# Patient Record
Sex: FEMALE | Race: WHITE | NOT HISPANIC OR LATINO
[De-identification: names, ages, dates, MRNs, and addresses within clinical notes are randomized per-mention and may not be internally consistent; named-entity substitution may affect disease eponyms.]

---

## 2017-01-31 ENCOUNTER — APPOINTMENT (OUTPATIENT)
Dept: UROGYNECOLOGY | Facility: CLINIC | Age: 77
End: 2017-01-31

## 2019-05-14 ENCOUNTER — TRANSCRIPTION ENCOUNTER (OUTPATIENT)
Age: 79
End: 2019-05-14

## 2019-09-03 ENCOUNTER — RESULT REVIEW (OUTPATIENT)
Age: 79
End: 2019-09-03

## 2019-09-16 ENCOUNTER — APPOINTMENT (OUTPATIENT)
Dept: INTERNAL MEDICINE | Facility: CLINIC | Age: 79
End: 2019-09-16
Payer: MEDICARE

## 2019-09-16 ENCOUNTER — NON-APPOINTMENT (OUTPATIENT)
Age: 79
End: 2019-09-16

## 2019-09-16 VITALS
HEART RATE: 72 BPM | TEMPERATURE: 98.3 F | OXYGEN SATURATION: 98 % | HEIGHT: 64 IN | BODY MASS INDEX: 31.07 KG/M2 | WEIGHT: 182 LBS

## 2019-09-16 VITALS — SYSTOLIC BLOOD PRESSURE: 125 MMHG | DIASTOLIC BLOOD PRESSURE: 65 MMHG

## 2019-09-16 PROCEDURE — 36415 COLL VENOUS BLD VENIPUNCTURE: CPT

## 2019-09-16 PROCEDURE — 93000 ELECTROCARDIOGRAM COMPLETE: CPT

## 2019-09-16 PROCEDURE — 99214 OFFICE O/P EST MOD 30 MIN: CPT | Mod: 25

## 2019-09-16 RX ORDER — ASPIRIN 81 MG/1
81 TABLET ORAL DAILY
Refills: 0 | Status: ACTIVE | COMMUNITY
Start: 2019-09-16

## 2019-09-16 RX ORDER — ZOLPIDEM TARTRATE 5 MG/1
5 TABLET, FILM COATED ORAL
Qty: 30 | Refills: 5 | Status: ACTIVE | COMMUNITY
Start: 2019-09-16

## 2019-09-16 RX ORDER — MODAFINIL 100 MG/1
100 TABLET ORAL DAILY
Refills: 0 | Status: ACTIVE | COMMUNITY
Start: 2019-09-16

## 2019-09-16 RX ORDER — LANCETS 30 GAUGE
EACH MISCELLANEOUS
Qty: 100 | Refills: 2 | Status: ACTIVE | COMMUNITY
Start: 2019-09-16 | End: 1900-01-01

## 2019-09-16 NOTE — PHYSICAL EXAM
[No Acute Distress] : no acute distress [Well Developed] : well developed [Well Nourished] : well nourished [Well-Appearing] : well-appearing [Normal Sclera/Conjunctiva] : normal sclera/conjunctiva [PERRL] : pupils equal round and reactive to light [EOMI] : extraocular movements intact [Normal Outer Ear/Nose] : the outer ears and nose were normal in appearance [Normal Oropharynx] : the oropharynx was normal [No JVD] : no jugular venous distention [No Lymphadenopathy] : no lymphadenopathy [Supple] : supple [Thyroid Normal, No Nodules] : the thyroid was normal and there were no nodules present [No Respiratory Distress] : no respiratory distress  [Clear to Auscultation] : lungs were clear to auscultation bilaterally [No Accessory Muscle Use] : no accessory muscle use [Normal Rate] : normal rate  [Regular Rhythm] : with a regular rhythm [No Murmur] : no murmur heard [Normal S1, S2] : normal S1 and S2 [No Carotid Bruits] : no carotid bruits [No Abdominal Bruit] : a ~M bruit was not heard ~T in the abdomen [No Varicosities] : no varicosities [Pedal Pulses Present] : the pedal pulses are present [No Edema] : there was no peripheral edema [No Palpable Aorta] : no palpable aorta [No Extremity Clubbing/Cyanosis] : no extremity clubbing/cyanosis [Soft] : abdomen soft [Non Tender] : non-tender [Non-distended] : non-distended [No Masses] : no abdominal mass palpated [Normal Bowel Sounds] : normal bowel sounds [No HSM] : no HSM [Normal Posterior Cervical Nodes] : no posterior cervical lymphadenopathy [No CVA Tenderness] : no CVA  tenderness [Normal Anterior Cervical Nodes] : no anterior cervical lymphadenopathy [No Spinal Tenderness] : no spinal tenderness [No Joint Swelling] : no joint swelling [Grossly Normal Strength/Tone] : grossly normal strength/tone [No Rash] : no rash [No Focal Deficits] : no focal deficits [Coordination Grossly Intact] : coordination grossly intact [Normal Gait] : normal gait [Normal Affect] : the affect was normal [Deep Tendon Reflexes (DTR)] : deep tendon reflexes were 2+ and symmetric [Normal Insight/Judgement] : insight and judgment were intact

## 2019-09-16 NOTE — HISTORY OF PRESENT ILLNESS
[de-identified] : the patient is a 71-year-old female comes in for followup of diabetes hyperlipidemia hypothyroid and GERD. She is with a nutritionist and has lost 3 pounds to 180, she does notepolydipsia polyuria polyphasia and her sugars at home are 100-150. She has had her eye checked and there is no problems. she denies chest pain palpitations swelling thinking but dyspnea and is working with a  She has no claudicationand no weakness numbness loss of balance and coordination At the present time she has no significant complaintsand is compliant with all her medications WDL

## 2019-09-16 NOTE — ASSESSMENT
[FreeTextEntry1] : the patient's type 2 diabetes and is on Actos beginning an EKG urine and blood and hemoglobin A1c. She appears to be well controlled by her home sugars and diet Blood pressure is well controlled She is status post left knee replacement and is having some pain bilaterally in the hips She is overall doing well. EKG shows a 2 R-wave in V2 as well as left anterior hemiblock but no other significant changes

## 2019-09-18 LAB
ALBUMIN SERPL ELPH-MCNC: 4.6 G/DL
ALP BLD-CCNC: 60 U/L
ALT SERPL-CCNC: 19 U/L
ANION GAP SERPL CALC-SCNC: 12 MMOL/L
APPEARANCE: CLEAR
AST SERPL-CCNC: 20 U/L
BACTERIA: NEGATIVE
BASOPHILS # BLD AUTO: 0.06 K/UL
BASOPHILS NFR BLD AUTO: 0.6 %
BILIRUB SERPL-MCNC: 0.3 MG/DL
BILIRUBIN URINE: NEGATIVE
BLOOD URINE: NEGATIVE
BUN SERPL-MCNC: 19 MG/DL
CALCIUM SERPL-MCNC: 10.3 MG/DL
CHLORIDE SERPL-SCNC: 103 MMOL/L
CHOLEST SERPL-MCNC: 154 MG/DL
CHOLEST/HDLC SERPL: 2.5 RATIO
CK SERPL-CCNC: 129 U/L
CO2 SERPL-SCNC: 25 MMOL/L
COLOR: NORMAL
CREAT SERPL-MCNC: 1 MG/DL
EOSINOPHIL # BLD AUTO: 0.31 K/UL
EOSINOPHIL NFR BLD AUTO: 3.3 %
ESTIMATED AVERAGE GLUCOSE: 154 MG/DL
GLUCOSE QUALITATIVE U: NEGATIVE
GLUCOSE SERPL-MCNC: 107 MG/DL
HBA1C MFR BLD HPLC: 7 %
HCT VFR BLD CALC: 41.1 %
HDLC SERPL-MCNC: 62 MG/DL
HGB BLD-MCNC: 12.7 G/DL
HYALINE CASTS: 0 /LPF
IMM GRANULOCYTES NFR BLD AUTO: 0.3 %
KETONES URINE: NEGATIVE
LDLC SERPL CALC-MCNC: 69 MG/DL
LEUKOCYTE ESTERASE URINE: ABNORMAL
LYMPHOCYTES # BLD AUTO: 3.12 K/UL
LYMPHOCYTES NFR BLD AUTO: 33.2 %
MAN DIFF?: NORMAL
MCHC RBC-ENTMCNC: 30.9 GM/DL
MCHC RBC-ENTMCNC: 31.1 PG
MCV RBC AUTO: 100.7 FL
MICROSCOPIC-UA: NORMAL
MONOCYTES # BLD AUTO: 0.8 K/UL
MONOCYTES NFR BLD AUTO: 8.5 %
NEUTROPHILS # BLD AUTO: 5.08 K/UL
NEUTROPHILS NFR BLD AUTO: 54.1 %
NITRITE URINE: NEGATIVE
PH URINE: 6
PLATELET # BLD AUTO: 303 K/UL
POTASSIUM SERPL-SCNC: 5 MMOL/L
PROT SERPL-MCNC: 6.8 G/DL
PROTEIN URINE: NEGATIVE
RBC # BLD: 4.08 M/UL
RBC # FLD: 13.7 %
RED BLOOD CELLS URINE: 0 /HPF
SODIUM SERPL-SCNC: 140 MMOL/L
SPECIFIC GRAVITY URINE: 1.01
SQUAMOUS EPITHELIAL CELLS: 2 /HPF
T4 SERPL-MCNC: 6.7 UG/DL
TRIGL SERPL-MCNC: 115 MG/DL
TSH SERPL-ACNC: 1.86 UIU/ML
UROBILINOGEN URINE: NORMAL
WBC # FLD AUTO: 9.4 K/UL
WHITE BLOOD CELLS URINE: 2 /HPF

## 2019-10-02 ENCOUNTER — RX RENEWAL (OUTPATIENT)
Age: 79
End: 2019-10-02

## 2019-10-23 ENCOUNTER — RX RENEWAL (OUTPATIENT)
Age: 79
End: 2019-10-23

## 2019-10-23 RX ORDER — LANCETS 30 GAUGE
EACH MISCELLANEOUS
Qty: 100 | Refills: 3 | Status: ACTIVE | COMMUNITY
Start: 2019-09-25 | End: 1900-01-01

## 2019-11-26 ENCOUNTER — RECORD ABSTRACTING (OUTPATIENT)
Age: 79
End: 2019-11-26

## 2019-11-26 DIAGNOSIS — Z80.0 FAMILY HISTORY OF MALIGNANT NEOPLASM OF DIGESTIVE ORGANS: ICD-10-CM

## 2019-11-26 DIAGNOSIS — Z82.49 FAMILY HISTORY OF ISCHEMIC HEART DISEASE AND OTHER DISEASES OF THE CIRCULATORY SYSTEM: ICD-10-CM

## 2019-11-26 DIAGNOSIS — Z86.010 PERSONAL HISTORY OF COLONIC POLYPS: ICD-10-CM

## 2019-11-27 ENCOUNTER — APPOINTMENT (OUTPATIENT)
Dept: INTERNAL MEDICINE | Facility: CLINIC | Age: 79
End: 2019-11-27
Payer: MEDICARE

## 2019-11-27 ENCOUNTER — NON-APPOINTMENT (OUTPATIENT)
Age: 79
End: 2019-11-27

## 2019-11-27 VITALS
OXYGEN SATURATION: 98 % | WEIGHT: 180 LBS | DIASTOLIC BLOOD PRESSURE: 80 MMHG | HEART RATE: 76 BPM | SYSTOLIC BLOOD PRESSURE: 120 MMHG | BODY MASS INDEX: 30.73 KG/M2 | HEIGHT: 64 IN

## 2019-11-27 PROCEDURE — 93000 ELECTROCARDIOGRAM COMPLETE: CPT

## 2019-11-27 PROCEDURE — 82270 OCCULT BLOOD FECES: CPT

## 2019-11-27 PROCEDURE — G0439: CPT

## 2019-11-27 PROCEDURE — 99213 OFFICE O/P EST LOW 20 MIN: CPT | Mod: 25

## 2019-11-27 PROCEDURE — 36415 COLL VENOUS BLD VENIPUNCTURE: CPT

## 2019-11-27 RX ORDER — OMEPRAZOLE 40 MG/1
40 CAPSULE, DELAYED RELEASE ORAL
Qty: 30 | Refills: 11 | Status: DISCONTINUED | COMMUNITY
Start: 2019-09-16 | End: 2019-11-27

## 2019-11-27 RX ORDER — BLOOD SUGAR DIAGNOSTIC
STRIP MISCELLANEOUS 4 TIMES DAILY
Qty: 100 | Refills: 3 | Status: ACTIVE | COMMUNITY
Start: 2019-09-25 | End: 1900-01-01

## 2019-11-27 NOTE — HISTORY OF PRESENT ILLNESS
[de-identified] : the patient is a 71-year-old female who has type 2 diabetes mellitus hyperlipidemia, hypothyroidism GERDas well as depression and comes in now for a complete physical examination.Her sugars at home have been labile and she is only on Actos 15 mg daily. She has lost 5 pounds. Her last hemoglobin A1c was 7.0after being 6.3 She had previously been on metformin but did not tolerate it GI-wise. She has her eyes checked and they are negative She has no symptoms of peripheral neuropathy and denies chest pain palpitations swelling or faintingand is physically active.At the present time she is not having any nausea vomiting pain in the belly positive bowel black bowel and is due for colonoscopy She has no urinary changes and denies polydipsia polyuria polyphagia. She has no gynecologic symptoms and is up to date on mammograms. She is up to date on all vaccinations

## 2019-11-27 NOTE — HEALTH RISK ASSESSMENT
[0] : 2) Feeling down, depressed, or hopeless: Not at all (0) [With Patient/Caregiver] : With Patient/Caregiver [Relationship: ___] : Relationship: [unfilled] [I will adhere to the patient's wishes as expressed in the advance directive except as noted below.] : I will adhere to the patient's wishes as expressed in the advance directive except as noted below [AdvancecareDate] : 11/27/19 [FreeTextEntry4] : The patient is now ready to decide on aggressive workup or treatment at this time

## 2019-11-27 NOTE — PHYSICAL EXAM
[Normal] : normal gait, coordination grossly intact, no focal deficits [de-identified] : overweight [de-identified] : no significant lesion [FreeTextEntry1] : guaiac negative no lesions [de-identified] : No [de-identified] : No significant lesion [de-identified] : Normal affect and doing

## 2019-11-27 NOTE — ASSESSMENT
[FreeTextEntry1] : the patient's physical exam appears to be normal in all screening tests are done. Her cardiogram shows no significant findings other thanLVH by voltage criteria Her thyroid cholesterol and diabetic status are all checked She is to get a colonoscopy is up to date on all other testing including mammograms gynecologic exam and vaccinations. We're awaiting her hemoglobin A1c

## 2019-11-28 LAB
25(OH)D3 SERPL-MCNC: 32.1 NG/ML
ALBUMIN SERPL ELPH-MCNC: 4.4 G/DL
ALP BLD-CCNC: 64 U/L
ALT SERPL-CCNC: 18 U/L
ANION GAP SERPL CALC-SCNC: 12 MMOL/L
APPEARANCE: CLEAR
AST SERPL-CCNC: 15 U/L
BACTERIA: NEGATIVE
BASOPHILS # BLD AUTO: 0.05 K/UL
BASOPHILS NFR BLD AUTO: 0.6 %
BILIRUB SERPL-MCNC: 0.3 MG/DL
BILIRUBIN URINE: NEGATIVE
BLOOD URINE: NEGATIVE
BUN SERPL-MCNC: 24 MG/DL
CALCIUM SERPL-MCNC: 10 MG/DL
CHLORIDE SERPL-SCNC: 103 MMOL/L
CHOLEST SERPL-MCNC: 183 MG/DL
CHOLEST/HDLC SERPL: 2.7 RATIO
CO2 SERPL-SCNC: 27 MMOL/L
COLOR: YELLOW
CREAT SERPL-MCNC: 0.92 MG/DL
EOSINOPHIL # BLD AUTO: 0.28 K/UL
EOSINOPHIL NFR BLD AUTO: 3.1 %
ESTIMATED AVERAGE GLUCOSE: 134 MG/DL
GLUCOSE QUALITATIVE U: NEGATIVE
GLUCOSE SERPL-MCNC: 144 MG/DL
HBA1C MFR BLD HPLC: 6.3 %
HCT VFR BLD CALC: 40.8 %
HDLC SERPL-MCNC: 67 MG/DL
HGB BLD-MCNC: 12.9 G/DL
HYALINE CASTS: 0 /LPF
IMM GRANULOCYTES NFR BLD AUTO: 0.6 %
KETONES URINE: NEGATIVE
LDLC SERPL CALC-MCNC: 86 MG/DL
LEUKOCYTE ESTERASE URINE: ABNORMAL
LYMPHOCYTES # BLD AUTO: 2.13 K/UL
LYMPHOCYTES NFR BLD AUTO: 24 %
MAN DIFF?: NORMAL
MCHC RBC-ENTMCNC: 30.8 PG
MCHC RBC-ENTMCNC: 31.6 GM/DL
MCV RBC AUTO: 97.4 FL
MICROSCOPIC-UA: NORMAL
MONOCYTES # BLD AUTO: 0.76 K/UL
MONOCYTES NFR BLD AUTO: 8.5 %
NEUTROPHILS # BLD AUTO: 5.62 K/UL
NEUTROPHILS NFR BLD AUTO: 63.2 %
NITRITE URINE: NEGATIVE
PH URINE: 7
PLATELET # BLD AUTO: 356 K/UL
POTASSIUM SERPL-SCNC: 5.1 MMOL/L
PROT SERPL-MCNC: 6.8 G/DL
PROTEIN URINE: NEGATIVE
RBC # BLD: 4.19 M/UL
RBC # FLD: 14.5 %
RED BLOOD CELLS URINE: 2 /HPF
SODIUM SERPL-SCNC: 142 MMOL/L
SPECIFIC GRAVITY URINE: 1.02
SQUAMOUS EPITHELIAL CELLS: 4 /HPF
T4 SERPL-MCNC: 6.1 UG/DL
TRIGL SERPL-MCNC: 151 MG/DL
TSH SERPL-ACNC: 2.41 UIU/ML
UROBILINOGEN URINE: NORMAL
WBC # FLD AUTO: 8.89 K/UL
WHITE BLOOD CELLS URINE: 5 /HPF

## 2019-12-22 ENCOUNTER — RX RENEWAL (OUTPATIENT)
Age: 79
End: 2019-12-22

## 2019-12-26 ENCOUNTER — RX RENEWAL (OUTPATIENT)
Age: 79
End: 2019-12-26

## 2020-01-20 ENCOUNTER — RX RENEWAL (OUTPATIENT)
Age: 80
End: 2020-01-20

## 2020-03-16 ENCOUNTER — TRANSCRIPTION ENCOUNTER (OUTPATIENT)
Age: 80
End: 2020-03-16

## 2020-03-16 ENCOUNTER — RX RENEWAL (OUTPATIENT)
Age: 80
End: 2020-03-16

## 2020-03-30 ENCOUNTER — RX RENEWAL (OUTPATIENT)
Age: 80
End: 2020-03-30

## 2020-06-25 ENCOUNTER — RX RENEWAL (OUTPATIENT)
Age: 80
End: 2020-06-25

## 2020-08-20 ENCOUNTER — APPOINTMENT (OUTPATIENT)
Dept: INTERNAL MEDICINE | Facility: CLINIC | Age: 80
End: 2020-08-20
Payer: MEDICARE

## 2020-08-20 VITALS
OXYGEN SATURATION: 96 % | DIASTOLIC BLOOD PRESSURE: 80 MMHG | HEIGHT: 64 IN | WEIGHT: 181 LBS | SYSTOLIC BLOOD PRESSURE: 130 MMHG | TEMPERATURE: 98 F | HEART RATE: 75 BPM | BODY MASS INDEX: 30.9 KG/M2

## 2020-08-20 PROCEDURE — 99214 OFFICE O/P EST MOD 30 MIN: CPT | Mod: 25

## 2020-08-20 PROCEDURE — 36415 COLL VENOUS BLD VENIPUNCTURE: CPT

## 2020-08-20 RX ORDER — CITALOPRAM 10 MG/1
10 TABLET, FILM COATED ORAL DAILY
Refills: 0 | Status: DISCONTINUED | COMMUNITY
Start: 2019-09-16 | End: 2020-08-20

## 2020-08-20 NOTE — HISTORY OF PRESENT ILLNESS
[de-identified] : Patient is a 80-year-old female comes in for followup of diabetes on Actos, hypothyroidism, and hyperlipidemia. The patient has found it hard to follow a diabetic diet and has gained 4 pounds. She is not checking home sugars and does have increasing urination. She has no chest pain, palpitations, swelling, thinking or dyspnea. She has had her eyes checked within the last year and has no changes in vision. She has no findings of suggestion of peripheral neuropathy or weakness. She denies muscle aches and pains GI symptoms or symptoms of hypothyroidism. She continues to see psychiatry for anxiety, depression, and is on medications as noted

## 2020-08-20 NOTE — REVIEW OF SYSTEMS
[Recent Change In Weight] : ~T recent weight change [Negative] : Heme/Lymph [FreeTextEntry9] : Patient complains of pain in the left hip, which is in constant

## 2020-08-20 NOTE — ASSESSMENT
[FreeTextEntry1] : We are checking her diabetic status, and hemoglobin A1c. She has no evidence of endorgan disease at this time. We're rechecking her thyroid status with thyroid function tests, and we're checking CPK, liver functions, and lipids. She is advised to get on a better diet and she will see endocrinology as well

## 2020-08-20 NOTE — PHYSICAL EXAM
[No Acute Distress] : no acute distress [Well Nourished] : well nourished [Well Developed] : well developed [Well-Appearing] : well-appearing [PERRL] : pupils equal round and reactive to light [Normal Sclera/Conjunctiva] : normal sclera/conjunctiva [EOMI] : extraocular movements intact [Normal Outer Ear/Nose] : the outer ears and nose were normal in appearance [Normal Oropharynx] : the oropharynx was normal [No JVD] : no jugular venous distention [No Lymphadenopathy] : no lymphadenopathy [Supple] : supple [Thyroid Normal, No Nodules] : the thyroid was normal and there were no nodules present [No Respiratory Distress] : no respiratory distress  [No Accessory Muscle Use] : no accessory muscle use [Clear to Auscultation] : lungs were clear to auscultation bilaterally [Normal Rate] : normal rate  [Regular Rhythm] : with a regular rhythm [Normal S1, S2] : normal S1 and S2 [No Murmur] : no murmur heard [No Carotid Bruits] : no carotid bruits [No Abdominal Bruit] : a ~M bruit was not heard ~T in the abdomen [No Varicosities] : no varicosities [Pedal Pulses Present] : the pedal pulses are present [No Edema] : there was no peripheral edema [No Palpable Aorta] : no palpable aorta [No Extremity Clubbing/Cyanosis] : no extremity clubbing/cyanosis [Soft] : abdomen soft [Non Tender] : non-tender [Non-distended] : non-distended [No Masses] : no abdominal mass palpated [No HSM] : no HSM [Normal Bowel Sounds] : normal bowel sounds [Normal Posterior Cervical Nodes] : no posterior cervical lymphadenopathy [Normal Anterior Cervical Nodes] : no anterior cervical lymphadenopathy [No CVA Tenderness] : no CVA  tenderness [No Spinal Tenderness] : no spinal tenderness [Grossly Normal Strength/Tone] : grossly normal strength/tone [No Joint Swelling] : no joint swelling [No Rash] : no rash [Coordination Grossly Intact] : coordination grossly intact [No Focal Deficits] : no focal deficits [Normal Gait] : normal gait [Normal Affect] : the affect was normal [Normal] : affect was normal and insight and judgment were intact [Comprehensive Foot Exam Normal] : Right and left foot were examined and both feet are normal. No ulcers in either foot. Toes are normal and with full ROM.  Normal tactile sensation with monofilament testing throughout both feet [Normal Insight/Judgement] : insight and judgment were intact [de-identified] : Overweight

## 2020-08-21 LAB
ALBUMIN SERPL ELPH-MCNC: 4.8 G/DL
ALP BLD-CCNC: 65 U/L
ALT SERPL-CCNC: 26 U/L
ANION GAP SERPL CALC-SCNC: 13 MMOL/L
APPEARANCE: CLEAR
AST SERPL-CCNC: 21 U/L
BACTERIA: NEGATIVE
BASOPHILS # BLD AUTO: 0.07 K/UL
BASOPHILS NFR BLD AUTO: 0.7 %
BILIRUB SERPL-MCNC: 0.4 MG/DL
BILIRUBIN URINE: NEGATIVE
BLOOD URINE: NEGATIVE
BUN SERPL-MCNC: 20 MG/DL
CALCIUM SERPL-MCNC: 10.3 MG/DL
CHLORIDE SERPL-SCNC: 102 MMOL/L
CHOLEST SERPL-MCNC: 199 MG/DL
CHOLEST/HDLC SERPL: 3 RATIO
CK SERPL-CCNC: 70 U/L
CO2 SERPL-SCNC: 25 MMOL/L
COLOR: YELLOW
CREAT SERPL-MCNC: 1.02 MG/DL
CRP SERPL HS-MCNC: 2.28 MG/L
EOSINOPHIL # BLD AUTO: 0.31 K/UL
EOSINOPHIL NFR BLD AUTO: 3.2 %
ESTIMATED AVERAGE GLUCOSE: 143 MG/DL
GLUCOSE QUALITATIVE U: NEGATIVE
GLUCOSE SERPL-MCNC: 124 MG/DL
HBA1C MFR BLD HPLC: 6.6 %
HCT VFR BLD CALC: 46.2 %
HDLC SERPL-MCNC: 67 MG/DL
HGB BLD-MCNC: 13.9 G/DL
HYALINE CASTS: 2 /LPF
IMM GRANULOCYTES NFR BLD AUTO: 0.6 %
KETONES URINE: NEGATIVE
LDLC SERPL CALC-MCNC: 100 MG/DL
LEUKOCYTE ESTERASE URINE: ABNORMAL
LYMPHOCYTES # BLD AUTO: 2.63 K/UL
LYMPHOCYTES NFR BLD AUTO: 26.9 %
MAN DIFF?: NORMAL
MCHC RBC-ENTMCNC: 30.1 GM/DL
MCHC RBC-ENTMCNC: 31 PG
MCV RBC AUTO: 103.1 FL
MICROSCOPIC-UA: NORMAL
MONOCYTES # BLD AUTO: 0.76 K/UL
MONOCYTES NFR BLD AUTO: 7.8 %
NEUTROPHILS # BLD AUTO: 5.94 K/UL
NEUTROPHILS NFR BLD AUTO: 60.8 %
NITRITE URINE: NEGATIVE
PH URINE: 6.5
PLATELET # BLD AUTO: 402 K/UL
POTASSIUM SERPL-SCNC: 5.1 MMOL/L
PROT SERPL-MCNC: 7.1 G/DL
PROTEIN URINE: NEGATIVE
RBC # BLD: 4.48 M/UL
RBC # FLD: 14.4 %
RED BLOOD CELLS URINE: 1 /HPF
SARS-COV-2 IGG SERPL IA-ACNC: 0.08 INDEX
SARS-COV-2 IGG SERPL QL IA: NEGATIVE
SODIUM SERPL-SCNC: 140 MMOL/L
SPECIFIC GRAVITY URINE: 1.02
SQUAMOUS EPITHELIAL CELLS: 5 /HPF
T4 SERPL-MCNC: 6.7 UG/DL
TRIGL SERPL-MCNC: 161 MG/DL
TSH SERPL-ACNC: 2.25 UIU/ML
UROBILINOGEN URINE: NORMAL
WBC # FLD AUTO: 9.77 K/UL
WHITE BLOOD CELLS URINE: 7 /HPF

## 2020-09-03 ENCOUNTER — RX RENEWAL (OUTPATIENT)
Age: 80
End: 2020-09-03

## 2020-09-24 ENCOUNTER — APPOINTMENT (OUTPATIENT)
Dept: MAMMOGRAPHY | Facility: HOSPITAL | Age: 80
End: 2020-09-24
Payer: MEDICARE

## 2020-09-24 ENCOUNTER — OUTPATIENT (OUTPATIENT)
Dept: OUTPATIENT SERVICES | Facility: HOSPITAL | Age: 80
LOS: 1 days | End: 2020-09-24
Payer: MEDICARE

## 2020-09-24 DIAGNOSIS — Z12.31 ENCOUNTER FOR SCREENING MAMMOGRAM FOR MALIGNANT NEOPLASM OF BREAST: ICD-10-CM

## 2020-09-24 PROCEDURE — 77063 BREAST TOMOSYNTHESIS BI: CPT

## 2020-09-24 PROCEDURE — 77067 SCR MAMMO BI INCL CAD: CPT | Mod: 26

## 2020-09-24 PROCEDURE — 77063 BREAST TOMOSYNTHESIS BI: CPT | Mod: 26

## 2020-09-24 PROCEDURE — 77067 SCR MAMMO BI INCL CAD: CPT

## 2020-10-29 ENCOUNTER — APPOINTMENT (OUTPATIENT)
Dept: ENDOCRINOLOGY | Facility: CLINIC | Age: 80
End: 2020-10-29
Payer: MEDICARE

## 2020-10-29 VITALS
TEMPERATURE: 98.4 F | DIASTOLIC BLOOD PRESSURE: 76 MMHG | BODY MASS INDEX: 31.41 KG/M2 | OXYGEN SATURATION: 95 % | HEART RATE: 77 BPM | WEIGHT: 183 LBS | SYSTOLIC BLOOD PRESSURE: 128 MMHG

## 2020-10-29 DIAGNOSIS — Z82.49 FAMILY HISTORY OF ISCHEMIC HEART DISEASE AND OTHER DISEASES OF THE CIRCULATORY SYSTEM: ICD-10-CM

## 2020-10-29 DIAGNOSIS — Z83.3 FAMILY HISTORY OF DIABETES MELLITUS: ICD-10-CM

## 2020-10-29 LAB — GLUCOSE BLDC GLUCOMTR-MCNC: 135

## 2020-10-29 PROCEDURE — 36415 COLL VENOUS BLD VENIPUNCTURE: CPT

## 2020-10-29 PROCEDURE — 99204 OFFICE O/P NEW MOD 45 MIN: CPT | Mod: 25

## 2020-10-29 PROCEDURE — 82962 GLUCOSE BLOOD TEST: CPT

## 2020-10-29 RX ORDER — BUPROPION HYDROCHLORIDE 150 MG/1
150 TABLET, FILM COATED, EXTENDED RELEASE ORAL DAILY
Refills: 0 | Status: DISCONTINUED | COMMUNITY
Start: 2019-09-16 | End: 2020-10-29

## 2020-10-29 RX ORDER — BUPROPION HYDROCHLORIDE 300 MG/1
300 TABLET, EXTENDED RELEASE ORAL DAILY
Refills: 0 | Status: ACTIVE | COMMUNITY
Start: 2020-10-29

## 2020-10-30 NOTE — HISTORY OF PRESENT ILLNESS
[FreeTextEntry1] : 81 yo F with PMH osteoporosis, hypothyroidism, DM2, HLD, GERD\par \par Diagnosed with DM2 9/2019\par B: 2 toast butter cheese coffee no sugar\par L SW egg + coffee \par wine + cheese + crackers\par D: protein veg wine  baked potato 1/3 of the plate carb\par last ophthalmologist visit 12/2019  \par on actos 15 mg qd\par does not have FS values \par has had the flu vaccine this year \par \par Hypothyroidism\par Dx 12 years ago\par On Lt4 50 mcg qd\par \par Osteoporosis\par managed by rheumatology Dr Ghotra

## 2020-11-02 RX ORDER — FLASH GLUCOSE SENSOR
KIT MISCELLANEOUS
Qty: 1 | Refills: 0 | Status: ACTIVE | COMMUNITY
Start: 2020-10-30 | End: 1900-01-01

## 2020-11-04 ENCOUNTER — APPOINTMENT (OUTPATIENT)
Dept: ULTRASOUND IMAGING | Facility: HOSPITAL | Age: 80
End: 2020-11-04
Payer: MEDICARE

## 2020-11-04 ENCOUNTER — OUTPATIENT (OUTPATIENT)
Dept: OUTPATIENT SERVICES | Facility: HOSPITAL | Age: 80
LOS: 1 days | End: 2020-11-04
Payer: MEDICARE

## 2020-11-04 DIAGNOSIS — E11.9 TYPE 2 DIABETES MELLITUS WITHOUT COMPLICATIONS: ICD-10-CM

## 2020-11-04 PROCEDURE — 76536 US EXAM OF HEAD AND NECK: CPT | Mod: 26

## 2020-11-04 PROCEDURE — 76536 US EXAM OF HEAD AND NECK: CPT

## 2020-11-09 LAB
25(OH)D3 SERPL-MCNC: 91.6 NG/ML
ALBUMIN SERPL ELPH-MCNC: 5 G/DL
ALP BLD-CCNC: 79 U/L
ALT SERPL-CCNC: 26 U/L
ANION GAP SERPL CALC-SCNC: 11 MMOL/L
AST SERPL-CCNC: 18 U/L
BILIRUB SERPL-MCNC: 0.3 MG/DL
BUN SERPL-MCNC: 19 MG/DL
CALCIUM SERPL-MCNC: 10.2 MG/DL
CALCIUM SERPL-MCNC: 10.2 MG/DL
CHLORIDE SERPL-SCNC: 104 MMOL/L
CO2 SERPL-SCNC: 26 MMOL/L
CREAT SERPL-MCNC: 0.93 MG/DL
CREAT SPEC-SCNC: 67 MG/DL
GLUCOSE SERPL-MCNC: 147 MG/DL
MICROALBUMIN 24H UR DL<=1MG/L-MCNC: <1.2 MG/DL
MICROALBUMIN/CREAT 24H UR-RTO: NORMAL MG/G
PARATHYROID HORMONE INTACT: 35 PG/ML
POTASSIUM SERPL-SCNC: 5.8 MMOL/L
PROT SERPL-MCNC: 7.4 G/DL
SARS-COV-2 IGG SERPL IA-ACNC: <3.8 AU/ML
SARS-COV-2 IGG SERPL QL IA: NEGATIVE
SODIUM SERPL-SCNC: 142 MMOL/L
T4 FREE SERPL-MCNC: 1.1 NG/DL
THYROGLOB AB SERPL-ACNC: <20 IU/ML
THYROPEROXIDASE AB SERPL IA-ACNC: <10 IU/ML
TSH SERPL-ACNC: 1.96 UIU/ML

## 2020-11-11 ENCOUNTER — TRANSCRIPTION ENCOUNTER (OUTPATIENT)
Age: 80
End: 2020-11-11

## 2020-11-16 ENCOUNTER — APPOINTMENT (OUTPATIENT)
Dept: ENDOCRINOLOGY | Facility: CLINIC | Age: 80
End: 2020-11-16
Payer: MEDICARE

## 2020-11-16 VITALS — HEIGHT: 64 IN | BODY MASS INDEX: 30.9 KG/M2 | WEIGHT: 181 LBS

## 2020-11-16 PROCEDURE — G0108 DIAB MANAGE TRN  PER INDIV: CPT

## 2020-11-23 ENCOUNTER — NON-APPOINTMENT (OUTPATIENT)
Age: 80
End: 2020-11-23

## 2020-11-30 LAB
ALBUMIN SERPL ELPH-MCNC: 4.8 G/DL
ALP BLD-CCNC: 89 U/L
ALT SERPL-CCNC: 29 U/L
ANION GAP SERPL CALC-SCNC: 10 MMOL/L
AST SERPL-CCNC: 20 U/L
BILIRUB SERPL-MCNC: 0.3 MG/DL
BUN SERPL-MCNC: 23 MG/DL
CALCIUM SERPL-MCNC: 9.9 MG/DL
CHLORIDE SERPL-SCNC: 103 MMOL/L
CO2 SERPL-SCNC: 27 MMOL/L
CREAT SERPL-MCNC: 1.13 MG/DL
GLUCOSE SERPL-MCNC: 204 MG/DL
POTASSIUM SERPL-SCNC: 5 MMOL/L
PROT SERPL-MCNC: 6.9 G/DL
SODIUM SERPL-SCNC: 139 MMOL/L

## 2020-12-03 ENCOUNTER — APPOINTMENT (OUTPATIENT)
Dept: INTERNAL MEDICINE | Facility: CLINIC | Age: 80
End: 2020-12-03
Payer: MEDICARE

## 2020-12-03 ENCOUNTER — NON-APPOINTMENT (OUTPATIENT)
Age: 80
End: 2020-12-03

## 2020-12-03 VITALS — TEMPERATURE: 97.9 F | WEIGHT: 176 LBS | BODY MASS INDEX: 30.21 KG/M2 | HEART RATE: 80 BPM | OXYGEN SATURATION: 97 %

## 2020-12-03 VITALS — SYSTOLIC BLOOD PRESSURE: 120 MMHG | DIASTOLIC BLOOD PRESSURE: 70 MMHG

## 2020-12-03 VITALS — HEIGHT: 64 IN

## 2020-12-03 DIAGNOSIS — Z23 ENCOUNTER FOR IMMUNIZATION: ICD-10-CM

## 2020-12-03 PROCEDURE — 82272 OCCULT BLD FECES 1-3 TESTS: CPT

## 2020-12-03 PROCEDURE — 93000 ELECTROCARDIOGRAM COMPLETE: CPT

## 2020-12-03 PROCEDURE — G0439: CPT

## 2020-12-03 PROCEDURE — 36415 COLL VENOUS BLD VENIPUNCTURE: CPT

## 2020-12-03 RX ORDER — PIOGLITAZONE HYDROCHLORIDE 15 MG/1
15 TABLET ORAL DAILY
Refills: 0 | Status: DISCONTINUED | COMMUNITY
Start: 2019-09-16 | End: 2020-12-03

## 2020-12-03 RX ORDER — FAMOTIDINE 20 MG
20 TABLET ORAL TWICE DAILY
Qty: 60 | Refills: 11 | Status: DISCONTINUED | COMMUNITY
Start: 2019-11-27 | End: 2020-12-03

## 2020-12-03 NOTE — ASSESSMENT
[FreeTextEntry1] : Pt doiing well w good diabetic control- HgbA1c 6.6. No evid of end organ d. Will redo sonogram thyroid 6 mon raTHER THAN HAVE BX NOW. Checking all bloods an K. EKG show lahb, LVH by voltager critera,tall R V1,2 - no change

## 2020-12-03 NOTE — PHYSICAL EXAM
[Normal] : normal gait, coordination grossly intact, no focal deficits [Comprehensive Foot Exam Normal] : Right and left foot were examined and both feet are normal. No ulcers in either foot. Toes are normal and with full ROM.  Normal tactile sensation with monofilament testing throughout both feet [de-identified] : cystic mild LUOQ [FreeTextEntry1] : guaiac neg, no mass [de-identified] : neg [de-identified] : neg [de-identified] : neg

## 2020-12-04 LAB
25(OH)D3 SERPL-MCNC: 37 NG/ML
ALBUMIN SERPL ELPH-MCNC: 4.6 G/DL
ALP BLD-CCNC: 85 U/L
ALT SERPL-CCNC: 32 U/L
ANION GAP SERPL CALC-SCNC: 11 MMOL/L
APPEARANCE: ABNORMAL
AST SERPL-CCNC: 25 U/L
BACTERIA: NEGATIVE
BASOPHILS # BLD AUTO: 0.06 K/UL
BASOPHILS NFR BLD AUTO: 0.5 %
BILIRUB SERPL-MCNC: 0.3 MG/DL
BILIRUBIN URINE: NEGATIVE
BLOOD URINE: NEGATIVE
BUN SERPL-MCNC: 18 MG/DL
CALCIUM SERPL-MCNC: 9.9 MG/DL
CHLORIDE SERPL-SCNC: 105 MMOL/L
CHOLEST SERPL-MCNC: 165 MG/DL
CK SERPL-CCNC: 83 U/L
CO2 SERPL-SCNC: 24 MMOL/L
COLOR: YELLOW
CREAT SERPL-MCNC: 0.94 MG/DL
CRP SERPL HS-MCNC: 2.17 MG/L
EOSINOPHIL # BLD AUTO: 0.27 K/UL
EOSINOPHIL NFR BLD AUTO: 2.4 %
GLUCOSE QUALITATIVE U: NEGATIVE
GLUCOSE SERPL-MCNC: 126 MG/DL
HCT VFR BLD CALC: 41.8 %
HDLC SERPL-MCNC: 58 MG/DL
HGB BLD-MCNC: 13.5 G/DL
HYALINE CASTS: 0 /LPF
IMM GRANULOCYTES NFR BLD AUTO: 0.5 %
KETONES URINE: NEGATIVE
LDLC SERPL CALC-MCNC: 75 MG/DL
LEUKOCYTE ESTERASE URINE: ABNORMAL
LYMPHOCYTES # BLD AUTO: 2.43 K/UL
LYMPHOCYTES NFR BLD AUTO: 21.9 %
MAN DIFF?: NORMAL
MCHC RBC-ENTMCNC: 30.9 PG
MCHC RBC-ENTMCNC: 32.3 GM/DL
MCV RBC AUTO: 95.7 FL
MICROSCOPIC-UA: NORMAL
MONOCYTES # BLD AUTO: 0.78 K/UL
MONOCYTES NFR BLD AUTO: 7 %
NEUTROPHILS # BLD AUTO: 7.48 K/UL
NEUTROPHILS NFR BLD AUTO: 67.7 %
NITRITE URINE: NEGATIVE
NONHDLC SERPL-MCNC: 107 MG/DL
PH URINE: 5.5
PLATELET # BLD AUTO: 404 K/UL
POTASSIUM SERPL-SCNC: 5.1 MMOL/L
PROT SERPL-MCNC: 7 G/DL
PROTEIN URINE: NORMAL
RBC # BLD: 4.37 M/UL
RBC # FLD: 13.8 %
RED BLOOD CELLS URINE: 3 /HPF
SODIUM SERPL-SCNC: 140 MMOL/L
SPECIFIC GRAVITY URINE: 1.03
SQUAMOUS EPITHELIAL CELLS: >27 /HPF
TRIGL SERPL-MCNC: 156 MG/DL
UROBILINOGEN URINE: NORMAL
WBC # FLD AUTO: 11.08 K/UL
WHITE BLOOD CELLS URINE: 35 /HPF

## 2020-12-19 ENCOUNTER — RX RENEWAL (OUTPATIENT)
Age: 80
End: 2020-12-19

## 2020-12-21 ENCOUNTER — RX RENEWAL (OUTPATIENT)
Age: 80
End: 2020-12-21

## 2021-01-07 ENCOUNTER — RX RENEWAL (OUTPATIENT)
Age: 81
End: 2021-01-07

## 2021-01-11 ENCOUNTER — APPOINTMENT (OUTPATIENT)
Dept: ENDOCRINOLOGY | Facility: CLINIC | Age: 81
End: 2021-01-11
Payer: MEDICARE

## 2021-01-11 VITALS — HEIGHT: 64 IN | BODY MASS INDEX: 29.37 KG/M2 | WEIGHT: 172 LBS

## 2021-01-11 PROCEDURE — 97803 MED NUTRITION INDIV SUBSEQ: CPT

## 2021-01-12 RX ORDER — GEL DRESSING
GEL (GRAM) TOPICAL
Qty: 1 | Refills: 0 | Status: ACTIVE | COMMUNITY
Start: 2021-01-11 | End: 1900-01-01

## 2021-01-15 ENCOUNTER — LABORATORY RESULT (OUTPATIENT)
Age: 81
End: 2021-01-15

## 2021-01-15 ENCOUNTER — RESULT REVIEW (OUTPATIENT)
Age: 81
End: 2021-01-15

## 2021-01-21 ENCOUNTER — APPOINTMENT (OUTPATIENT)
Dept: ENDOCRINOLOGY | Facility: CLINIC | Age: 81
End: 2021-01-21
Payer: MEDICARE

## 2021-01-21 VITALS
DIASTOLIC BLOOD PRESSURE: 70 MMHG | BODY MASS INDEX: 29.37 KG/M2 | SYSTOLIC BLOOD PRESSURE: 120 MMHG | OXYGEN SATURATION: 95 % | WEIGHT: 172 LBS | HEART RATE: 84 BPM | HEIGHT: 64 IN

## 2021-01-21 LAB — HBA1C MFR BLD HPLC: 129

## 2021-01-21 PROCEDURE — 83036 HEMOGLOBIN GLYCOSYLATED A1C: CPT | Mod: QW

## 2021-01-21 PROCEDURE — 99213 OFFICE O/P EST LOW 20 MIN: CPT | Mod: 25

## 2021-01-21 RX ORDER — SEMAGLUTIDE 1.34 MG/ML
2 INJECTION, SOLUTION SUBCUTANEOUS
Qty: 1 | Refills: 3 | Status: DISCONTINUED | COMMUNITY
Start: 2020-11-19 | End: 2021-01-21

## 2021-01-21 RX ORDER — PIOGLITAZONE HYDROCHLORIDE 15 MG/1
15 TABLET ORAL DAILY
Qty: 90 | Refills: 0 | Status: DISCONTINUED | COMMUNITY
Start: 2019-09-25 | End: 2021-01-21

## 2021-01-22 NOTE — ASSESSMENT
[FreeTextEntry1] : 79 yo F with PMH osteoporosis, hypothyroidism, DM2, HLD, GERD\par \par 1. DM2 - continue ozempic\par \par 2. HLD - continue crestor\par \par 3. Hypothyroidism/MNG - continue Lt4 50 mcg qd. refer for thyroid FNA\par \par 4. Osteoporosis - managed by rheum

## 2021-01-22 NOTE — HISTORY OF PRESENT ILLNESS
[FreeTextEntry1] : 81 yo F with PMH osteoporosis, hypothyroidism, DM2, HLD, GERD\par \par Diagnosed with DM2 9/2019\par transitioned to ozempic 0.5 mg q week after last visit\par B: 1 toast cottage cheese coffee \par L: 1/2 SW fruit\par wine and cheese\par D: veg meat cookies \par snacks on nuts\par last ophthalmologist visit 12/2019  \par no longer on  actos 15 mg qd\par \par MNG/Hypothyroidism\par Dx 12 years ago\par On Lt4 50 mcg qd\par pending FNA of right mid 1.2 x 0.6 x 0.7 and LL 1.3 x 0.8 x 1.0 nodules . \par \par Osteoporosis\par managed by rheumatology Dr Ghotra

## 2021-01-25 ENCOUNTER — RESULT REVIEW (OUTPATIENT)
Age: 81
End: 2021-01-25

## 2021-01-25 ENCOUNTER — LABORATORY RESULT (OUTPATIENT)
Age: 81
End: 2021-01-25

## 2021-01-25 ENCOUNTER — APPOINTMENT (OUTPATIENT)
Dept: OBGYN | Facility: CLINIC | Age: 81
End: 2021-01-25
Payer: MEDICARE

## 2021-01-25 PROCEDURE — 58100 BIOPSY OF UTERUS LINING: CPT

## 2021-01-25 PROCEDURE — 99203 OFFICE O/P NEW LOW 30 MIN: CPT | Mod: 25

## 2021-01-25 PROCEDURE — 99213 OFFICE O/P EST LOW 20 MIN: CPT | Mod: 25

## 2021-01-25 NOTE — PLAN
[FreeTextEntry1] : 79yo F with 1 month of PMB, prior to this, no VB since menopause. New finding on sonogram showed increased vascularity, initial biopsy was inconclusive and repeat was done today. Pt notes VB stopped last week on thurs/friday, 4-5 days ago. \par \par EMBx repeated today for r/o neoplasia.

## 2021-01-25 NOTE — PROCEDURE
[Endometrial Biopsy] : Endometrial biopsy [Post-Menop. Bleeding] : post-menopausal bleeding [Risks] : risks [Benefits] : benefits [Bleeding] : bleeding [Tenaculum] : Tenaculum [Easy Passage] : Easy passage [Sounded to ___ cm] : sounded to [unfilled] ~Ucm [Tolerated Well] : Patient tolerated the procedure well [No Complications] : No complications [None] : none [Moderate] : moderate [Sent to Pathology] : placed in buffered formalin and sent for pathology [de-identified] : 1 month of PMB, prior to this, no VB since menopause. New finding on sonogram showed increased vascularity, initial biopsy was inconclusive.  [de-identified] : tylenol 1000mg

## 2021-02-01 LAB
25(OH)D3 SERPL-MCNC: 35.2 NG/ML
ALBUMIN SERPL ELPH-MCNC: 5 G/DL
ALP BLD-CCNC: 82 U/L
ALT SERPL-CCNC: 30 U/L
ANION GAP SERPL CALC-SCNC: 19 MMOL/L
AST SERPL-CCNC: 22 U/L
BILIRUB SERPL-MCNC: 0.3 MG/DL
BUN SERPL-MCNC: 22 MG/DL
CALCIUM SERPL-MCNC: 10.2 MG/DL
CHLORIDE SERPL-SCNC: 99 MMOL/L
CO2 SERPL-SCNC: 22 MMOL/L
CREAT SERPL-MCNC: 1.24 MG/DL
ESTIMATED AVERAGE GLUCOSE: 137 MG/DL
GLUCOSE SERPL-MCNC: 74 MG/DL
HBA1C MFR BLD HPLC: 6.4 %
POTASSIUM SERPL-SCNC: 4.6 MMOL/L
PROT SERPL-MCNC: 7.3 G/DL
SODIUM SERPL-SCNC: 140 MMOL/L
T4 FREE SERPL-MCNC: 1.1 NG/DL
THYROGLOB AB SERPL-ACNC: <20 IU/ML
THYROPEROXIDASE AB SERPL IA-ACNC: <10 IU/ML
TSH SERPL-ACNC: 1.98 UIU/ML

## 2021-02-12 ENCOUNTER — TRANSCRIPTION ENCOUNTER (OUTPATIENT)
Age: 81
End: 2021-02-12

## 2021-02-18 ENCOUNTER — APPOINTMENT (OUTPATIENT)
Dept: ENDOCRINOLOGY | Facility: CLINIC | Age: 81
End: 2021-02-18

## 2021-02-19 ENCOUNTER — APPOINTMENT (OUTPATIENT)
Dept: OBGYN | Facility: CLINIC | Age: 81
End: 2021-02-19

## 2021-02-24 ENCOUNTER — NON-APPOINTMENT (OUTPATIENT)
Age: 81
End: 2021-02-24

## 2021-02-24 ENCOUNTER — APPOINTMENT (OUTPATIENT)
Dept: ENDOCRINOLOGY | Facility: CLINIC | Age: 81
End: 2021-02-24
Payer: MEDICARE

## 2021-02-24 VITALS
BODY MASS INDEX: 28.51 KG/M2 | TEMPERATURE: 97.2 F | HEIGHT: 64 IN | OXYGEN SATURATION: 98 % | SYSTOLIC BLOOD PRESSURE: 132 MMHG | DIASTOLIC BLOOD PRESSURE: 70 MMHG | WEIGHT: 167 LBS | HEART RATE: 81 BPM

## 2021-02-24 PROCEDURE — 99204 OFFICE O/P NEW MOD 45 MIN: CPT | Mod: 25

## 2021-02-24 PROCEDURE — 99214 OFFICE O/P EST MOD 30 MIN: CPT | Mod: 25

## 2021-02-24 PROCEDURE — 95251 CONT GLUC MNTR ANALYSIS I&R: CPT

## 2021-02-25 NOTE — PHYSICAL EXAM
[Alert] : alert [Well Nourished] : well nourished [No Acute Distress] : no acute distress [Well Developed] : well developed [Normal Sclera/Conjunctiva] : normal sclera/conjunctiva [EOMI] : extra ocular movement intact [No Proptosis] : no proptosis [Normal Oropharynx] : the oropharynx was normal [Thyroid Not Enlarged] : the thyroid was not enlarged [No Thyroid Nodules] : no palpable thyroid nodules [No Respiratory Distress] : no respiratory distress [No Accessory Muscle Use] : no accessory muscle use [Clear to Auscultation] : lungs were clear to auscultation bilaterally [Normal S1, S2] : normal S1 and S2 [Normal Rate] : heart rate was normal [Regular Rhythm] : with a regular rhythm [No Edema] : no peripheral edema [Pedal Pulses Normal] : the pedal pulses are present [Soft] : abdomen soft [Normal Anterior Cervical Nodes] : no anterior cervical lymphadenopathy [Normal Posterior Cervical Nodes] : no posterior cervical lymphadenopathy [No Spinal Tenderness] : no spinal tenderness [Spine Straight] : spine straight [No Stigmata of Cushings Syndrome] : no stigmata of Cushings Syndrome [Normal Gait] : normal gait [Normal Strength/Tone] : muscle strength and tone were normal [No Rash] : no rash [Right Foot Was Examined] : right foot ~C was examined [Left Foot Was Examined] : left foot ~C was examined [Normal] : normal [Full ROM] : with full range of motion [No Tremors] : no tremors [Oriented x3] : oriented to person, place, and time [Normal Affect] : the affect was normal [Normal Mood] : the mood was normal [Acanthosis Nigricans] : no acanthosis nigricans [Delayed in the Right Toes] : normal in the toes [Delayed in the Left Toes] : normal in the toes [Diminished Throughout Both Feet] : normal tactile sensation with monofilament testing throughout both feet

## 2021-02-25 NOTE — ASSESSMENT
[FreeTextEntry1] : 80F presents for initial evaluation of DM2, hypothyroidism, osteoporosis.\par \par 1) DM2\par HbA1c 6.4% controlled, at goal\par Continue Ozempic 0.5mg SQ weekly\par patient pleased with weight loss so far and would like to lose more weight.\par Reviewed strategies for carb consistent diet and physical activity.\par Continue use of Avinash\par notify me if develops hypoglycemia\par BP stable, microalb neg 10/2020, not on ACEI/ARB. Prior hyperkalemia resolved without intervention. GFR41\par Follow up ophtho, DM foot precautions\par \par 2) Hypothyroidism\par TFT at goal, continue levothyroxine 50 mcg po daily, take in AM on empty stomach\par \par 3) Thyroid nodules\par bilateral nodules, largest 1.3cm, manage conservatively for now, repeat thyroid US 6 months and will determine need for FNA \par \par 4) Osteoporosis\par managed by rheumatology, Reclast, determine when last dose given and obtain DXA results for review\par \par 5) Hyperlipidemia\par continue rosuvastatin 5mg\par \par follow up 4 months

## 2021-02-25 NOTE — HISTORY OF PRESENT ILLNESS
[FreeTextEntry1] : 80F presents to establish care for DM2 management, looking for new endocrinologist.\par Had prediabetes for some time and then developed DM2 about 1 year ago.\par No known complications, denies retinopathy, neuropathy, nephropathy.\par Had tried metformin developed nausea and unwell could not tolerate. Tried lower dose for 6 months and then stopped.\par Then PCP switched to Actos x about 6 months and then it was stopped after initiating care with Dr. Huddleston.\par She was started on Avinash and Ozempic 0.5mg once weekly 11/20/2020. Lost 13 lbs which she is pleased about.\par Diet review:\par breakfast: white toast and cottage cheese, eggs or cereal\par Lunch: half sandwich, fruit\par Dinner: protein, vegetable, small potato\par Drinks water, coffee no sugar, a little wine, diet coke\par Snacks: minimal but may have fruit with peanut butter\par Overall eating less than before since being on Ozempic \par Using Avinash, download reviewed, see full report scanned into allscripts:\par 99% TIR\par 0% hyperglycemia\par 1% hypoglycemia\par GMI 5.9%\par She reports no overt hypoglycemia events, lowest values seen in 70s\par \par History of long term acquired hypothyroidism on levothyroxine 50 mcg many since young age.\par Patient also was found with thyroid nodules on ultrasound, has not had FNA.\par Also has history of osteoporosis, follows long term with a rheumatologist and receives Reclast infusions but does not recall when the last one was received.

## 2021-03-02 ENCOUNTER — APPOINTMENT (OUTPATIENT)
Dept: ENDOCRINOLOGY | Facility: CLINIC | Age: 81
End: 2021-03-02

## 2021-03-10 ENCOUNTER — APPOINTMENT (OUTPATIENT)
Dept: OBGYN | Facility: CLINIC | Age: 81
End: 2021-03-10

## 2021-03-12 ENCOUNTER — RX RENEWAL (OUTPATIENT)
Age: 81
End: 2021-03-12

## 2021-03-16 ENCOUNTER — RX RENEWAL (OUTPATIENT)
Age: 81
End: 2021-03-16

## 2021-03-29 ENCOUNTER — APPOINTMENT (OUTPATIENT)
Dept: OBGYN | Facility: CLINIC | Age: 81
End: 2021-03-29

## 2021-04-23 ENCOUNTER — APPOINTMENT (OUTPATIENT)
Dept: INTERNAL MEDICINE | Facility: CLINIC | Age: 81
End: 2021-04-23
Payer: MEDICARE

## 2021-04-23 PROBLEM — Z86.39 HISTORY OF HYPERKALEMIA: Status: ACTIVE | Noted: 2020-11-30

## 2021-04-23 PROCEDURE — 36415 COLL VENOUS BLD VENIPUNCTURE: CPT

## 2021-04-24 LAB
ALBUMIN SERPL ELPH-MCNC: 4.6 G/DL
ALP BLD-CCNC: 85 U/L
ALT SERPL-CCNC: 18 U/L
ANION GAP SERPL CALC-SCNC: 13 MMOL/L
AST SERPL-CCNC: 17 U/L
BASOPHILS # BLD AUTO: 0.05 K/UL
BASOPHILS NFR BLD AUTO: 0.5 %
BILIRUB SERPL-MCNC: 0.4 MG/DL
BUN SERPL-MCNC: 26 MG/DL
CALCIUM SERPL-MCNC: 10 MG/DL
CHLORIDE SERPL-SCNC: 106 MMOL/L
CHOLEST SERPL-MCNC: 166 MG/DL
CK SERPL-CCNC: 122 U/L
CO2 SERPL-SCNC: 25 MMOL/L
CREAT SERPL-MCNC: 0.98 MG/DL
CRP SERPL-MCNC: <3 MG/L
EOSINOPHIL # BLD AUTO: 0.32 K/UL
EOSINOPHIL NFR BLD AUTO: 2.9 %
GLUCOSE SERPL-MCNC: 121 MG/DL
HCT VFR BLD CALC: 43.3 %
HDLC SERPL-MCNC: 62 MG/DL
HGB BLD-MCNC: 13.7 G/DL
IMM GRANULOCYTES NFR BLD AUTO: 0.5 %
LDLC SERPL CALC-MCNC: 75 MG/DL
LYMPHOCYTES # BLD AUTO: 2.51 K/UL
LYMPHOCYTES NFR BLD AUTO: 23 %
MAN DIFF?: NORMAL
MCHC RBC-ENTMCNC: 31.1 PG
MCHC RBC-ENTMCNC: 31.6 GM/DL
MCV RBC AUTO: 98.2 FL
MONOCYTES # BLD AUTO: 0.75 K/UL
MONOCYTES NFR BLD AUTO: 6.9 %
NEUTROPHILS # BLD AUTO: 7.22 K/UL
NEUTROPHILS NFR BLD AUTO: 66.2 %
NONHDLC SERPL-MCNC: 103 MG/DL
PLATELET # BLD AUTO: 411 K/UL
POTASSIUM SERPL-SCNC: 4.8 MMOL/L
PROT SERPL-MCNC: 6.9 G/DL
RBC # BLD: 4.41 M/UL
RBC # FLD: 14.2 %
SODIUM SERPL-SCNC: 143 MMOL/L
T4 SERPL-MCNC: 6.2 UG/DL
TRIGL SERPL-MCNC: 142 MG/DL
TSH SERPL-ACNC: 1.79 UIU/ML
WBC # FLD AUTO: 10.9 K/UL

## 2021-04-26 LAB
5NT SERPL-CCNC: 3 IU/L
ESTIMATED AVERAGE GLUCOSE: 134 MG/DL
HBA1C MFR BLD HPLC: 6.3 %

## 2021-04-27 ENCOUNTER — ASOB RESULT (OUTPATIENT)
Age: 81
End: 2021-04-27

## 2021-04-27 ENCOUNTER — APPOINTMENT (OUTPATIENT)
Dept: OBGYN | Facility: CLINIC | Age: 81
End: 2021-04-27
Payer: MEDICARE

## 2021-04-27 DIAGNOSIS — Z86.39 PERSONAL HISTORY OF OTHER ENDOCRINE, NUTRITIONAL AND METABOLIC DISEASE: ICD-10-CM

## 2021-04-27 DIAGNOSIS — N95.0 POSTMENOPAUSAL BLEEDING: ICD-10-CM

## 2021-04-27 PROCEDURE — 58340 CATHETER FOR HYSTEROGRAPHY: CPT

## 2021-04-27 PROCEDURE — 76831 ECHO EXAM UTERUS: CPT

## 2021-05-04 NOTE — ASSESSMENT
[FreeTextEntry1] : 79 yo F with PMH osteoporosis, hypothyroidism, DM2, HLD, GERD\par \par 1. DM2 - to attempt ozempic pending insurance (sample given) instead of actos once log received. Could not tolerate metformin due to GI side effects\par \par 2. HLD - continue crestor\par \par 3. Hypothyroidism - continue Lt4 50 mcg qd. refer for thyroid US\par \par 4. Osteoporosis - managed by rheum stated

## 2021-06-30 ENCOUNTER — APPOINTMENT (OUTPATIENT)
Dept: ENDOCRINOLOGY | Facility: CLINIC | Age: 81
End: 2021-06-30
Payer: MEDICARE

## 2021-06-30 VITALS
HEIGHT: 64 IN | TEMPERATURE: 97.7 F | BODY MASS INDEX: 28.51 KG/M2 | HEART RATE: 72 BPM | DIASTOLIC BLOOD PRESSURE: 72 MMHG | WEIGHT: 167 LBS | OXYGEN SATURATION: 98 % | SYSTOLIC BLOOD PRESSURE: 132 MMHG

## 2021-06-30 LAB — HBA1C MFR BLD HPLC: 5.9

## 2021-06-30 PROCEDURE — 99214 OFFICE O/P EST MOD 30 MIN: CPT | Mod: 25

## 2021-06-30 PROCEDURE — 95251 CONT GLUC MNTR ANALYSIS I&R: CPT

## 2021-06-30 PROCEDURE — 83036 HEMOGLOBIN GLYCOSYLATED A1C: CPT | Mod: QW

## 2021-07-16 ENCOUNTER — NON-APPOINTMENT (OUTPATIENT)
Age: 81
End: 2021-07-16

## 2021-07-21 ENCOUNTER — APPOINTMENT (OUTPATIENT)
Dept: ENDOCRINOLOGY | Facility: CLINIC | Age: 81
End: 2021-07-21
Payer: MEDICARE

## 2021-07-21 VITALS
BODY MASS INDEX: 27.31 KG/M2 | OXYGEN SATURATION: 98 % | TEMPERATURE: 97.7 F | HEIGHT: 64 IN | HEART RATE: 85 BPM | SYSTOLIC BLOOD PRESSURE: 128 MMHG | WEIGHT: 160 LBS | DIASTOLIC BLOOD PRESSURE: 74 MMHG

## 2021-07-21 PROCEDURE — 76536 US EXAM OF HEAD AND NECK: CPT

## 2021-07-27 ENCOUNTER — TRANSCRIPTION ENCOUNTER (OUTPATIENT)
Age: 81
End: 2021-07-27

## 2021-08-09 ENCOUNTER — TRANSCRIPTION ENCOUNTER (OUTPATIENT)
Age: 81
End: 2021-08-09

## 2021-08-11 ENCOUNTER — APPOINTMENT (OUTPATIENT)
Dept: ENDOCRINOLOGY | Facility: CLINIC | Age: 81
End: 2021-08-11
Payer: MEDICARE

## 2021-08-11 VITALS
BODY MASS INDEX: 27.31 KG/M2 | SYSTOLIC BLOOD PRESSURE: 130 MMHG | TEMPERATURE: 97.3 F | OXYGEN SATURATION: 97 % | HEART RATE: 78 BPM | DIASTOLIC BLOOD PRESSURE: 70 MMHG | HEIGHT: 64 IN | WEIGHT: 160 LBS

## 2021-08-11 PROCEDURE — 10005 FNA BX W/US GDN 1ST LES: CPT

## 2021-08-11 PROCEDURE — 10006 FNA BX W/US GDN EA ADDL: CPT

## 2021-08-11 NOTE — ASSESSMENT
[FreeTextEntry1] : 82 y/o female with MNG, hypothyroidism and T2DM here for FNA of thyroid nodules\par \par - LLP 1.3 x 0.7 x 0.8 nodule with microcalcifications biopsied with 4 needle passes and u/s guidance.\par - Sample collected for thyroseq \par - Low risk of hypocellular sample or nondiagnostic diagnosis given dry aspirate \par - Patient tolerated procedure well without complications\par \par Will call her with results\par \par Burak Mckee DO\par \par

## 2021-08-11 NOTE — PROCEDURE
[Fine Needle Aspiration] : Fine needle aspiration ~T ~C was performed. [Area of Mass: ______] : mass identified in the [unfilled] [Risks] : risks [Patient] : the patient [Benefits] : benefits [Consent Obtained] : Written consent was obtained prior to the procedure and is detailed in the patient's record [Ethyl Chloride] : ethyl chloride [Lidocaine Cream] : lidocaine cream [Supine] : The patient was placed in the supine position with the neck extended as tolerated. [Alcohol] : with alcohol [27 gauge 1.5 inch] : A 27 gauge 1.5 inch needle was used [____ Passes] : [unfilled] passes were made through the mass [Ultrasonic Guidance] : ultrasound guidance was employed [Sent to Histology] : The specimens were prepared in the usual manner and sent to histology. [Thyroseq] : Thyroseq [Tolerated Well] : the patient tolerated the procedure well [Vital Signs Stable] : the vital signs were stable [Hemostasis] : hemostasis was assured and the patient was discharged in satisfactory condition [No Complications] : There were no complications

## 2021-08-11 NOTE — ASSESSMENT
[FreeTextEntry1] : 80 y/o female with MNG, hypothyroidism and T2DM here for FNA of thyroid nodules\par \par - LLP 1.3 x 0.7 x 0.8 nodule with microcalcifications biopsied with 4 needle passes and u/s guidance.\par - Sample collected for thyroseq \par - Low risk of hypocellular sample or nondiagnostic diagnosis given dry aspirate \par - Patient tolerated procedure well without complications\par \par Will call her with results\par \par Burak Mckee DO\par \par

## 2021-08-13 ENCOUNTER — NON-APPOINTMENT (OUTPATIENT)
Age: 81
End: 2021-08-13

## 2021-08-13 LAB — FNA, THYROID: NORMAL

## 2021-08-28 ENCOUNTER — RX RENEWAL (OUTPATIENT)
Age: 81
End: 2021-08-28

## 2021-10-06 DIAGNOSIS — Z12.39 ENCOUNTER FOR OTHER SCREENING FOR MALIGNANT NEOPLASM OF BREAST: ICD-10-CM

## 2021-10-08 ENCOUNTER — APPOINTMENT (OUTPATIENT)
Dept: INTERNAL MEDICINE | Facility: CLINIC | Age: 81
End: 2021-10-08
Payer: MEDICARE

## 2021-10-08 PROCEDURE — G0008: CPT

## 2021-10-08 PROCEDURE — 90662 IIV NO PRSV INCREASED AG IM: CPT

## 2021-10-12 ENCOUNTER — RESULT REVIEW (OUTPATIENT)
Age: 81
End: 2021-10-12

## 2021-10-12 ENCOUNTER — APPOINTMENT (OUTPATIENT)
Dept: MAMMOGRAPHY | Facility: HOSPITAL | Age: 81
End: 2021-10-12
Payer: MEDICARE

## 2021-10-12 ENCOUNTER — OUTPATIENT (OUTPATIENT)
Dept: OUTPATIENT SERVICES | Facility: HOSPITAL | Age: 81
LOS: 1 days | End: 2021-10-12
Payer: MEDICARE

## 2021-10-12 DIAGNOSIS — Z00.8 ENCOUNTER FOR OTHER GENERAL EXAMINATION: ICD-10-CM

## 2021-10-12 PROCEDURE — 77063 BREAST TOMOSYNTHESIS BI: CPT

## 2021-10-12 PROCEDURE — 77063 BREAST TOMOSYNTHESIS BI: CPT | Mod: 26

## 2021-10-12 PROCEDURE — 77067 SCR MAMMO BI INCL CAD: CPT

## 2021-10-12 PROCEDURE — 77067 SCR MAMMO BI INCL CAD: CPT | Mod: 26

## 2021-10-27 ENCOUNTER — APPOINTMENT (OUTPATIENT)
Dept: ENDOCRINOLOGY | Facility: CLINIC | Age: 81
End: 2021-10-27
Payer: MEDICARE

## 2021-10-27 VITALS
HEART RATE: 68 BPM | OXYGEN SATURATION: 97 % | BODY MASS INDEX: 27.31 KG/M2 | TEMPERATURE: 97.2 F | SYSTOLIC BLOOD PRESSURE: 118 MMHG | WEIGHT: 160 LBS | DIASTOLIC BLOOD PRESSURE: 78 MMHG | HEIGHT: 64 IN

## 2021-10-27 LAB
HBA1C MFR BLD HPLC: 5.9
SARS-COV-2 N GENE NPH QL NAA+PROBE: NOT DETECTED

## 2021-10-27 PROCEDURE — 95251 CONT GLUC MNTR ANALYSIS I&R: CPT

## 2021-10-27 PROCEDURE — 99214 OFFICE O/P EST MOD 30 MIN: CPT | Mod: 25

## 2021-10-27 PROCEDURE — 83036 HEMOGLOBIN GLYCOSYLATED A1C: CPT | Mod: QW

## 2021-10-29 LAB
CREAT SPEC-SCNC: 132 MG/DL
MICROALBUMIN 24H UR DL<=1MG/L-MCNC: <1.2 MG/DL
MICROALBUMIN/CREAT 24H UR-RTO: NORMAL MG/G

## 2021-11-18 ENCOUNTER — APPOINTMENT (OUTPATIENT)
Dept: GASTROENTEROLOGY | Facility: CLINIC | Age: 81
End: 2021-11-18

## 2021-12-07 ENCOUNTER — APPOINTMENT (OUTPATIENT)
Dept: RADIOLOGY | Facility: HOSPITAL | Age: 81
End: 2021-12-07
Payer: MEDICARE

## 2021-12-07 ENCOUNTER — OUTPATIENT (OUTPATIENT)
Dept: OUTPATIENT SERVICES | Facility: HOSPITAL | Age: 81
LOS: 1 days | End: 2021-12-07
Payer: MEDICARE

## 2021-12-07 DIAGNOSIS — Z00.8 ENCOUNTER FOR OTHER GENERAL EXAMINATION: ICD-10-CM

## 2021-12-07 PROCEDURE — 77080 DXA BONE DENSITY AXIAL: CPT

## 2021-12-07 PROCEDURE — 77080 DXA BONE DENSITY AXIAL: CPT | Mod: 26

## 2021-12-08 ENCOUNTER — RX RENEWAL (OUTPATIENT)
Age: 81
End: 2021-12-08

## 2021-12-09 ENCOUNTER — APPOINTMENT (OUTPATIENT)
Dept: INTERNAL MEDICINE | Facility: CLINIC | Age: 81
End: 2021-12-09
Payer: MEDICARE

## 2021-12-09 ENCOUNTER — NON-APPOINTMENT (OUTPATIENT)
Age: 81
End: 2021-12-09

## 2021-12-09 VITALS
SYSTOLIC BLOOD PRESSURE: 120 MMHG | WEIGHT: 160 LBS | BODY MASS INDEX: 27.31 KG/M2 | TEMPERATURE: 97.9 F | HEART RATE: 80 BPM | OXYGEN SATURATION: 97 % | HEIGHT: 64 IN | DIASTOLIC BLOOD PRESSURE: 80 MMHG

## 2021-12-09 PROCEDURE — 93000 ELECTROCARDIOGRAM COMPLETE: CPT | Mod: 59

## 2021-12-09 PROCEDURE — G0439: CPT

## 2021-12-09 PROCEDURE — 36415 COLL VENOUS BLD VENIPUNCTURE: CPT

## 2021-12-09 NOTE — ASSESSMENT
[FreeTextEntry1] : Ck DM control. all screening tests up to date vax. No tx tremor. Seeing pschy, endocrine, rheumatology. EKG -no change LAHB tall R v1 no change

## 2021-12-09 NOTE — HISTORY OF PRESENT ILLNESS
[de-identified] : *0 F for CPE. Hx of diabetes followed by endocrinology - 20 l;b wt loss w ozempic -HgbA1c in good range  no end organ sympr. Exercises 2x/wk w/o CP sympt GI neg, stress incontinence otherwise neg  and GYNe Mammog updated as is vax. No0 complainta exc tremor w/o other neuro sympr.

## 2021-12-09 NOTE — PHYSICAL EXAM
[Normal Female:] : bladder was normal on palpation [Normal] : no CVA tenderness, no spinal tenderness [Comprehensive Foot Exam Normal] : Right and left foot were examined and both feet are normal. No ulcers in either foot. Toes are normal and with full ROM.  Normal tactile sensation with monofilament testing throughout both feet [de-identified] : neg [FreeTextEntry1] : guaiac neg [de-identified] : neg [de-identified] : symet goiter [de-identified] : L knee replacement [de-identified] : neg [de-identified] : mild R nand tremor

## 2021-12-10 ENCOUNTER — NON-APPOINTMENT (OUTPATIENT)
Age: 81
End: 2021-12-10

## 2021-12-10 LAB
25(OH)D3 SERPL-MCNC: 32.6 NG/ML
ALBUMIN SERPL ELPH-MCNC: 4.5 G/DL
ALP BLD-CCNC: 86 U/L
ALT SERPL-CCNC: 17 U/L
ANION GAP SERPL CALC-SCNC: 10 MMOL/L
APPEARANCE: CLEAR
AST SERPL-CCNC: 18 U/L
BACTERIA: NEGATIVE
BASOPHILS # BLD AUTO: 0.07 K/UL
BASOPHILS NFR BLD AUTO: 0.7 %
BILIRUB SERPL-MCNC: 0.3 MG/DL
BILIRUBIN URINE: NEGATIVE
BLOOD URINE: NEGATIVE
BUN SERPL-MCNC: 22 MG/DL
CALCIUM SERPL-MCNC: 10.1 MG/DL
CHLORIDE SERPL-SCNC: 105 MMOL/L
CHOLEST SERPL-MCNC: 179 MG/DL
CK SERPL-CCNC: 110 U/L
CO2 SERPL-SCNC: 26 MMOL/L
COLOR: YELLOW
CREAT SERPL-MCNC: 1.02 MG/DL
CRP SERPL HS-MCNC: 1.01 MG/L
EOSINOPHIL # BLD AUTO: 0.43 K/UL
EOSINOPHIL NFR BLD AUTO: 4.2 %
ESTIMATED AVERAGE GLUCOSE: 131 MG/DL
GLUCOSE QUALITATIVE U: NEGATIVE
GLUCOSE SERPL-MCNC: 113 MG/DL
HBA1C MFR BLD HPLC: 6.2 %
HCT VFR BLD CALC: 43.5 %
HDLC SERPL-MCNC: 66 MG/DL
HGB BLD-MCNC: 13.9 G/DL
HYALINE CASTS: 4 /LPF
IMM GRANULOCYTES NFR BLD AUTO: 0.5 %
KETONES URINE: NEGATIVE
LDLC SERPL CALC-MCNC: 82 MG/DL
LEUKOCYTE ESTERASE URINE: ABNORMAL
LYMPHOCYTES # BLD AUTO: 2.43 K/UL
LYMPHOCYTES NFR BLD AUTO: 24 %
MAN DIFF?: NORMAL
MCHC RBC-ENTMCNC: 31 PG
MCHC RBC-ENTMCNC: 32 GM/DL
MCV RBC AUTO: 96.9 FL
MICROSCOPIC-UA: NORMAL
MONOCYTES # BLD AUTO: 0.85 K/UL
MONOCYTES NFR BLD AUTO: 8.4 %
NEUTROPHILS # BLD AUTO: 6.29 K/UL
NEUTROPHILS NFR BLD AUTO: 62.2 %
NITRITE URINE: NEGATIVE
NONHDLC SERPL-MCNC: 113 MG/DL
PH URINE: 5.5
PLATELET # BLD AUTO: 380 K/UL
POTASSIUM SERPL-SCNC: 5 MMOL/L
PROT SERPL-MCNC: 7 G/DL
PROTEIN URINE: NORMAL
RBC # BLD: 4.49 M/UL
RBC # FLD: 14.1 %
RED BLOOD CELLS URINE: 3 /HPF
SODIUM SERPL-SCNC: 141 MMOL/L
SPECIFIC GRAVITY URINE: 1.02
SQUAMOUS EPITHELIAL CELLS: 13 /HPF
T4 SERPL-MCNC: 5.6 UG/DL
TRIGL SERPL-MCNC: 158 MG/DL
TSH SERPL-ACNC: 2.22 UIU/ML
UROBILINOGEN URINE: NORMAL
WBC # FLD AUTO: 10.12 K/UL
WHITE BLOOD CELLS URINE: 8 /HPF

## 2021-12-14 ENCOUNTER — TRANSCRIPTION ENCOUNTER (OUTPATIENT)
Age: 81
End: 2021-12-14

## 2021-12-15 NOTE — PHYSICAL EXAM
[Alert] : alert [Well Nourished] : well nourished [No Acute Distress] : no acute distress [Well Developed] : well developed [Normal Sclera/Conjunctiva] : normal sclera/conjunctiva [EOMI] : extra ocular movement intact [No Proptosis] : no proptosis [Normal Oropharynx] : the oropharynx was normal [Thyroid Not Enlarged] : the thyroid was not enlarged [No Thyroid Nodules] : no palpable thyroid nodules [No Respiratory Distress] : no respiratory distress [No Accessory Muscle Use] : no accessory muscle use [Clear to Auscultation] : lungs were clear to auscultation bilaterally [Normal S1, S2] : normal S1 and S2 [Normal Rate] : heart rate was normal [Regular Rhythm] : with a regular rhythm [No Edema] : no peripheral edema [Pedal Pulses Normal] : the pedal pulses are present [Soft] : abdomen soft [Normal Anterior Cervical Nodes] : no anterior cervical lymphadenopathy [No Spinal Tenderness] : no spinal tenderness [Spine Straight] : spine straight [No Stigmata of Cushings Syndrome] : no stigmata of Cushings Syndrome [Normal Gait] : normal gait [No Involuntary Movements] : no involuntary movements were seen [Normal Strength/Tone] : muscle strength and tone were normal [No Rash] : no rash [Right foot was examined, including] : right foot ~C was examined, including visual inspection with sensory and pulse exams [Left foot was examined, including] : left foot ~C was examined, including visual inspection with sensory and pulse exams [Normal] : normal [Full ROM] : with full range of motion [No Tremors] : no tremors [Oriented x3] : oriented to person, place, and time [Normal Affect] : the affect was normal [Normal Mood] : the mood was normal [Acanthosis Nigricans] : no acanthosis nigricans [Delayed in the Right Toes] : normal in the toes [Delayed in the Left Toes] : normal in the toes [Diminished Throughout Both Feet] : normal tactile sensation with monofilament testing throughout both feet

## 2021-12-15 NOTE — HISTORY OF PRESENT ILLNESS
[FreeTextEntry1] : 81F presents to follow up for DM2 management\par Had prediabetes for some time and then developed DM2 about 1+ year ago.\par No known complications, denies retinopathy, neuropathy, nephropathy.\par Had tried metformin developed nausea and unwell could not tolerate. Tried lower dose for 6 months and then stopped.\par Then PCP switched to Actos x about 6 months and then it was stopped after initiating care with Dr. Huddleston.\par She was started on Avinash and Ozempic 0.5mg once weekly 11/20/2020. Lost additional 7 lbs.\par Weight stable recently, would ideally like to lose 5 lbs more.\par Overall eating less than before since being on Ozempic \par Using Avinash, download reviewed, see full report scanned into allscripts:\par avg glu 112\par 99% TIR\par 1% hyperglycemia\par 0% hypoglycemia\par She reports no hypoglycemia events\par \par History of long term acquired hypothyroidism on levothyroxine 50 mcg many since young age.\par Patient also was found with thyroid nodules on ultrasound, has not had FNA.\par Saw Dr. Mckee for FNA aug 2021 - LLP nodule benign (category 2). RMP nodule - cat 1 nondiagnostic, plan for follow up US 6 months.\par Also has history of osteoporosis, follows long term with a rheumatologist Dr. Chasidy John and receives Reclast infusions last dose received 1/22/2020. Last DXA 9/2019. To obtain records from that office.

## 2021-12-15 NOTE — ASSESSMENT
[FreeTextEntry1] : 81F presents for initial evaluation of DM2, hypothyroidism, osteoporosis.\par \par 1) DM2\par HbA1c 5.9% well controlled, at goal\par Continue Ozempic 0.5mg SQ weekly\par Initial weight loss now stable\par continue strategies for carb consistent diet and physical activity.\par Continue use of Avinash\par notify me if develops hypoglycemia\par BP stable, microalb neg 10/2020, not on ACEI/ARB. Prior hyperkalemia resolved without intervention. GFR 54\par Follow up ophtho, DM foot precautions\par Will be traveling to [place] for 4 days loss in family, discussed adjusting day of the week for Ozempic\par \par 2) Hypothyroidism\par TFT at goal, continue levothyroxine 50 mcg po daily, take in AM on empty stomach\par \par 3) Thyroid nodules\par bilateral nodules, largest 1.3cm, \par LLP nodule benign on FNA, RMP nodule nondiagnostic.\par manage conservatively for now, repeat thyroid US 6 months\par \par 4) Osteoporosis\par managed by rheumatology, Reclast last given 1/22/2020, last DXA 9/2019\par Elligible for next DXA now\par \par 5) Hyperlipidemia\par continue rosuvastatin 5mg\par \par follow up 6 months

## 2022-01-12 ENCOUNTER — TRANSCRIPTION ENCOUNTER (OUTPATIENT)
Age: 82
End: 2022-01-12

## 2022-01-20 LAB — SARS-COV-2 N GENE NPH QL NAA+PROBE: NOT DETECTED

## 2022-03-16 ENCOUNTER — APPOINTMENT (OUTPATIENT)
Dept: OBGYN | Facility: CLINIC | Age: 82
End: 2022-03-16
Payer: MEDICARE

## 2022-03-16 VITALS
SYSTOLIC BLOOD PRESSURE: 132 MMHG | DIASTOLIC BLOOD PRESSURE: 83 MMHG | HEIGHT: 64 IN | BODY MASS INDEX: 26.8 KG/M2 | WEIGHT: 157 LBS

## 2022-03-16 PROCEDURE — G0101: CPT | Mod: GA

## 2022-03-16 PROCEDURE — G0328 FECAL BLOOD SCRN IMMUNOASSAY: CPT | Mod: GA,QW

## 2022-04-05 ENCOUNTER — APPOINTMENT (OUTPATIENT)
Dept: OBGYN | Facility: CLINIC | Age: 82
End: 2022-04-05

## 2022-04-06 ENCOUNTER — APPOINTMENT (OUTPATIENT)
Dept: ENDOCRINOLOGY | Facility: CLINIC | Age: 82
End: 2022-04-06
Payer: MEDICARE

## 2022-04-06 VITALS
HEIGHT: 64 IN | WEIGHT: 159 LBS | TEMPERATURE: 97.1 F | HEART RATE: 76 BPM | BODY MASS INDEX: 27.14 KG/M2 | DIASTOLIC BLOOD PRESSURE: 80 MMHG | SYSTOLIC BLOOD PRESSURE: 126 MMHG | OXYGEN SATURATION: 97 %

## 2022-04-06 LAB — HBA1C MFR BLD HPLC: 5.9

## 2022-04-06 PROCEDURE — 83036 HEMOGLOBIN GLYCOSYLATED A1C: CPT | Mod: QW

## 2022-04-06 PROCEDURE — 95251 CONT GLUC MNTR ANALYSIS I&R: CPT

## 2022-04-06 PROCEDURE — 99214 OFFICE O/P EST MOD 30 MIN: CPT | Mod: 25

## 2022-04-06 RX ORDER — BLOOD SUGAR DIAGNOSTIC
STRIP MISCELLANEOUS
Qty: 1 | Refills: 5 | Status: ACTIVE | COMMUNITY
Start: 2022-04-06 | End: 1900-01-01

## 2022-04-06 RX ORDER — LANCETS 33 GAUGE
EACH MISCELLANEOUS
Qty: 1 | Refills: 5 | Status: ACTIVE | COMMUNITY
Start: 2022-04-06 | End: 1900-01-01

## 2022-04-06 NOTE — HISTORY OF PRESENT ILLNESS
[FreeTextEntry1] : 81F presents to follow up for DM2 management\par No known complications, denies retinopathy, neuropathy, nephropathy.\par Had tried metformin developed nausea and unwell could not tolerate. Tried lower dose for 6 months and then stopped.\par Then PCP switched to Actos x about 6 months.\par She was started on Avinash and Ozempic 0.5mg once weekly 11/20/2020.\par Weight stable recently, would ideally like to lose more weight.\par Overall eating less than before since being on Ozempic. Occasional ice cream.\par Using Avinash, download reviewed, see full report scanned into allscripts:\par avg glu 103\par 98% TIR\par 0% hyperglycemia\par 2% hypoglycemia\par She reports no hypoglycemia symptoms when readings show low (overnight, early AM or predinner).\par \par History of long term acquired hypothyroidism on levothyroxine 50 mcg many since young age.\par Patient also was found with thyroid nodules on ultrasound, has not had FNA.\par Saw Dr. Mckee for FNA aug 2021 - LLP nodule benign (category 2). RMP nodule - cat 1 nondiagnostic, plan for follow up US 6 months.\par Also has history of osteoporosis, follows long term with a rheumatologist Dr. Chasidy John and receives Reclast infusions last dose received 1/22/2020. Had DXA 12/2021 osteopenia range and improved from prior.

## 2022-04-06 NOTE — ASSESSMENT
[FreeTextEntry1] : 81F presents for initial evaluation of DM2, hypothyroidism, osteoporosis.\par \par 1) DM2\par HbA1c 5.9% well controlled, at goal (reviewed that given age A1c could be higher but she is very happy with results of Ozempic and would like to stay on it).\par Reviewed correlating low glucose on Avinash with FS. Given glucometer and supplies prescribed.\par Also reviewed having bed time snack with carb/protein/fat.\par She also would like RD counselling.\par Continue Ozempic 0.5mg SQ weekly\par Initial weight loss now stable. Explained that she can maintain at current weight and does not need aggressive weight loss given age and overall doing well.\par continue strategies for carb consistent diet and physical activity.\par Continue use of Avinash\par notify me if develops hypoglycemia\par BP stable, microalb neg 10/2021, not on ACEI/ARB. Prior hyperkalemia resolved without intervention. GFR 52\par Follow up ophtho, DM foot precautions\par \par 2) Hypothyroidism\par TFT at goal, continue levothyroxine 50 mcg po daily, take in AM on empty stomach\par \par 3) Thyroid nodules\par bilateral nodules, largest 1.3cm, \par LLP nodule benign on FNA, RMP nodule nondiagnostic.\par manage conservatively for now, repeat thyroid US 6 months (she wishes to wait additional 6 months, will reorder US next visit).\par \par 4) Osteoporosis\par managed by rheumatology, Reclast last given 1/22/2020, last DXA 12/2021 osteopenia range: T score -1.5 at hip, normal BMD at spine). Monitor conservatively for now. Dairy in diet, vit D supplement.\par \par 5) Hyperlipidemia\par continue rosuvastatin 5mg\par \par follow up 6 months

## 2022-04-06 NOTE — PHYSICAL EXAM
[Alert] : alert [Well Nourished] : well nourished [No Acute Distress] : no acute distress [Well Developed] : well developed [Normal Sclera/Conjunctiva] : normal sclera/conjunctiva [EOMI] : extra ocular movement intact [No Proptosis] : no proptosis [Normal Oropharynx] : the oropharynx was normal [Thyroid Not Enlarged] : the thyroid was not enlarged [No Thyroid Nodules] : no palpable thyroid nodules [No Respiratory Distress] : no respiratory distress [No Accessory Muscle Use] : no accessory muscle use [Clear to Auscultation] : lungs were clear to auscultation bilaterally [Normal S1, S2] : normal S1 and S2 [Normal Rate] : heart rate was normal [Regular Rhythm] : with a regular rhythm [No Edema] : no peripheral edema [Pedal Pulses Normal] : the pedal pulses are present [Soft] : abdomen soft [Normal Anterior Cervical Nodes] : no anterior cervical lymphadenopathy [No Spinal Tenderness] : no spinal tenderness [Spine Straight] : spine straight [No Stigmata of Cushings Syndrome] : no stigmata of Cushings Syndrome [Normal Gait] : normal gait [No Involuntary Movements] : no involuntary movements were seen [Normal Strength/Tone] : muscle strength and tone were normal [No Rash] : no rash [Normal] : normal [Full ROM] : with full range of motion [No Tremors] : no tremors [Oriented x3] : oriented to person, place, and time [Normal Affect] : the affect was normal [Normal Mood] : the mood was normal [Acanthosis Nigricans] : no acanthosis nigricans [Right foot was examined, including] : right foot ~C was examined, including visual inspection with sensory and pulse exams [Left foot was examined, including] : left foot ~C was examined, including visual inspection with sensory and pulse exams [Delayed in the Right Toes] : normal in the toes [Delayed in the Left Toes] : normal in the toes [Diminished Throughout Both Feet] : normal tactile sensation with monofilament testing throughout both feet

## 2022-04-07 LAB
CREAT SPEC-SCNC: 113 MG/DL
MICROALBUMIN 24H UR DL<=1MG/L-MCNC: <1.2 MG/DL
MICROALBUMIN/CREAT 24H UR-RTO: NORMAL MG/G

## 2022-05-19 ENCOUNTER — APPOINTMENT (OUTPATIENT)
Dept: ENDOCRINOLOGY | Facility: CLINIC | Age: 82
End: 2022-05-19
Payer: MEDICARE

## 2022-05-19 PROCEDURE — G0108 DIAB MANAGE TRN  PER INDIV: CPT

## 2022-05-31 ENCOUNTER — ASOB RESULT (OUTPATIENT)
Age: 82
End: 2022-05-31

## 2022-05-31 ENCOUNTER — APPOINTMENT (OUTPATIENT)
Dept: OBGYN | Facility: CLINIC | Age: 82
End: 2022-05-31
Payer: MEDICARE

## 2022-05-31 PROCEDURE — 76830 TRANSVAGINAL US NON-OB: CPT

## 2022-06-01 ENCOUNTER — NON-APPOINTMENT (OUTPATIENT)
Age: 82
End: 2022-06-01

## 2022-06-28 ENCOUNTER — RX RENEWAL (OUTPATIENT)
Age: 82
End: 2022-06-28

## 2022-07-08 ENCOUNTER — APPOINTMENT (OUTPATIENT)
Dept: ORTHOPEDIC SURGERY | Facility: CLINIC | Age: 82
End: 2022-07-08

## 2022-07-08 VITALS — BODY MASS INDEX: 27.14 KG/M2 | HEIGHT: 64 IN | WEIGHT: 159 LBS

## 2022-07-08 DIAGNOSIS — M51.36 OTHER INTERVERTEBRAL DISC DEGENERATION, LUMBAR REGION: ICD-10-CM

## 2022-07-08 PROCEDURE — 72100 X-RAY EXAM L-S SPINE 2/3 VWS: CPT

## 2022-07-08 PROCEDURE — 99204 OFFICE O/P NEW MOD 45 MIN: CPT

## 2022-07-08 NOTE — HISTORY OF PRESENT ILLNESS
[de-identified] : Patient presents today c/o lower back pain with right leg radicular pain x 1 week with insidious onset. Denies injury, trauma, or fall to her back. Pain is described as an intermittent sharp, shooting pain from her right sided lower back going down the posterior aspect of her right leg to her right knee. Pain is aggravated with getting up from a seated position and walking any prolonged distance. Pain is improved with rest and lying down. She has tried Motrin and heating pads as well, which seem to help. She has not previously sought treatment for this condition before.\par Pt denies fever, chills, weight changes, loss of bladder control, bowel incontinence or urinary retention or saddle anesthesia\par The patient's past medical history, past surgical history, medications, allergies, and social history were reviewed by me today with the patient and documented accordingly. In addition, the patient's family history, which is noncontributory to this visit, was also reviewed.

## 2022-07-08 NOTE — PHYSICAL EXAM
[UE/LE] : Sensory: Intact in bilateral upper & lower extremities [0] : left ankle jerk 0 [ALL] : dorsalis pedis, posterior tibial, femoral, popliteal, and radial 2+ and symmetric bilaterally [de-identified] : Lumbar ROM:Restricted\par NTTP L spine and b/l paraspinals lumbar region\par Skin intact L spine. No rashes, ulcers, blisters. \par No lymphedema. \par Rapid alternating movements- intact\par Finger to nose testing- intact\par Full and non-painful ROM RUE, LUE, RLE and LLE. Skin intact RUE, LUE, RLE and LLE. No rashes, blisters, ulcers. NTTP RUE, LUE, RLE and LLE. No evidence of dislocation or subluxation B/L upper and lower extremities. [de-identified] : 2 views AP and Lat of Lumbar Spine from [id number] . Read and dictated by Dr. Maury Quintanilla Board Certified Orthopaedic surgeon Lumbar spondylosis

## 2022-07-08 NOTE — DISCUSSION/SUMMARY
[de-identified] : PT, NSAIDS, moist heat, RTO as needed, no surgical intervention. \par \par Diagnosis, prognosis, natural history and treatment was discussed with patient. Patient was advised if the following symptoms develop: chills, fever, \par loss of bladder control, bowel incontinence or urinary retention, numbness/tingling or weakness is present in upper or lower extremities, to go to the nearest emergency room. This may be a new clinical condition not present at the time of the patient visit  that may lead to paralysis and/or death, Patient advised if the above symptoms developed to also call the office immediately to inform us and to go to the nearest emergency room.

## 2022-10-06 ENCOUNTER — APPOINTMENT (OUTPATIENT)
Dept: OTOLARYNGOLOGY | Facility: CLINIC | Age: 82
End: 2022-10-06

## 2022-10-06 VITALS
WEIGHT: 162 LBS | SYSTOLIC BLOOD PRESSURE: 128 MMHG | BODY MASS INDEX: 27.66 KG/M2 | HEART RATE: 77 BPM | HEIGHT: 64 IN | DIASTOLIC BLOOD PRESSURE: 88 MMHG

## 2022-10-06 PROCEDURE — 31575 DIAGNOSTIC LARYNGOSCOPY: CPT

## 2022-10-06 PROCEDURE — 99204 OFFICE O/P NEW MOD 45 MIN: CPT | Mod: 25

## 2022-10-06 RX ORDER — ACYCLOVIR 50 MG/G
5 OINTMENT TOPICAL 3 TIMES DAILY
Qty: 1 | Refills: 2 | Status: COMPLETED | COMMUNITY
Start: 2021-04-27 | End: 2022-10-06

## 2022-10-06 RX ORDER — ACYCLOVIR 50 MG/G
5 OINTMENT TOPICAL
Qty: 1 | Refills: 1 | Status: COMPLETED | COMMUNITY
Start: 2021-03-18 | End: 2022-10-06

## 2022-10-06 RX ORDER — CELECOXIB 200 MG/1
200 CAPSULE ORAL TWICE DAILY
Qty: 60 | Refills: 1 | Status: COMPLETED | COMMUNITY
Start: 2022-07-08 | End: 2022-10-06

## 2022-10-06 RX ORDER — UBIDECARENONE 100 MG
100 CAPSULE ORAL
Refills: 0 | Status: COMPLETED | COMMUNITY
Start: 2019-09-16 | End: 2022-10-06

## 2022-10-06 NOTE — CONSULT LETTER
[Dear  ___] : Dear  [unfilled], [Consult Letter:] : I had the pleasure of evaluating your patient, [unfilled]. [Please see my note below.] : Please see my note below. [Consult Closing:] : Thank you very much for allowing me to participate in the care of this patient.  If you have any questions, please do not hesitate to contact me. [Sincerely,] : Sincerely, [FreeTextEntry2] : Dr Chaz Gutiérrez [FreeTextEntry3] : \par Tremayne Cleveland MD, FACS\par \par Otolaryngology-Head and Neck Surgery\par [employer] [facility]\par

## 2022-10-06 NOTE — REASON FOR VISIT
[Initial Evaluation] : an initial evaluation for [Spouse] : spouse [FreeTextEntry2] : voice hoarseness/ laryngocele

## 2022-10-06 NOTE — HISTORY OF PRESENT ILLNESS
[de-identified] : 82 yro pt referred by ENT Dr. 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 She has reflux, controlled with omeprazole. \par Voice is overall stable. Denies dysphagia, dyspnea, dysphonia, or voice hoarseness. No fever, chills, weight loss. Eating and drinking without issues. 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 \par Denies h/o smoking. \par No neck imaging done.

## 2022-10-12 ENCOUNTER — APPOINTMENT (OUTPATIENT)
Dept: ENDOCRINOLOGY | Facility: CLINIC | Age: 82
End: 2022-10-12

## 2022-10-12 VITALS
OXYGEN SATURATION: 97 % | TEMPERATURE: 97.7 F | WEIGHT: 162 LBS | DIASTOLIC BLOOD PRESSURE: 84 MMHG | HEIGHT: 64 IN | HEART RATE: 78 BPM | SYSTOLIC BLOOD PRESSURE: 124 MMHG | BODY MASS INDEX: 27.66 KG/M2

## 2022-10-12 LAB
GLUCOSE BLDC GLUCOMTR-MCNC: 143
HBA1C MFR BLD HPLC: 6

## 2022-10-12 PROCEDURE — 99214 OFFICE O/P EST MOD 30 MIN: CPT | Mod: 25

## 2022-10-12 PROCEDURE — 82962 GLUCOSE BLOOD TEST: CPT

## 2022-10-12 PROCEDURE — 95251 CONT GLUC MNTR ANALYSIS I&R: CPT

## 2022-10-12 PROCEDURE — 83036 HEMOGLOBIN GLYCOSYLATED A1C: CPT | Mod: QW

## 2022-10-12 NOTE — HISTORY OF PRESENT ILLNESS
[FreeTextEntry1] : 82F presents to follow up for DM2 management\par No known complications, denies retinopathy, neuropathy, nephropathy.\par Had tried metformin developed nausea and unwell could not tolerate. \par She was started on Avinash and Ozempic 0.5mg once weekly 11/20/2020.\par Weight stable recently, would ideally like to lose more weight.\par Overall eating less than before since being on Ozempic. Occasional ice cream.\par Using Avinash, download reviewed, see full report scanned into allscripts:\par avg glu 109\par 96% TIR\par 0% very high\par 2% high\par 2% low\par 0% very low\par 2% hypoglycemia\par She reports no hypoglycemia symptoms \par \par History of long term acquired hypothyroidism on levothyroxine 50 mcg many since young age.\par Patient also was found with thyroid nodules on ultrasound, has not had FNA.\par Saw Dr. Mckee for FNA aug 2021 - LLP nodule benign (category 2). RMP nodule - cat 1 nondiagnostic, recommendationfor follow up US 6 months. She recently has had workup for laryngeal lesion will be having CT neck soon.\par \par Also has history of osteoporosis, follows long term with a rheumatologist Dr. Chasidy John and receives Reclast infusions last dose received 1/22/2020. Had DXA 12/2021 osteopenia range and improved from prior.

## 2022-10-14 ENCOUNTER — NON-APPOINTMENT (OUTPATIENT)
Age: 82
End: 2022-10-14

## 2022-10-14 ENCOUNTER — APPOINTMENT (OUTPATIENT)
Dept: INTERNAL MEDICINE | Facility: CLINIC | Age: 82
End: 2022-10-14

## 2022-10-14 PROCEDURE — 99213 OFFICE O/P EST LOW 20 MIN: CPT | Mod: 95

## 2022-10-14 NOTE — REVIEW OF SYSTEMS
[Sore Throat] : sore throat [Cough] : cough [Negative] : Heme/Lymph [Earache] : no earache [Hoarseness] : no hoarseness [Shortness Of Breath] : no shortness of breath [Wheezing] : no wheezing

## 2022-10-14 NOTE — HISTORY OF PRESENT ILLNESS
[Home] : at home, [unfilled] , at the time of the visit. [Medical Office: ([facility])___] : at the medical office located in  [Verbal consent obtained from patient] : the patient, [unfilled] [FreeTextEntry8] : KELIN TAO is a 82 year F who presents for sore throat and cough.\par Reports she had a cold for 1 week with some nasal congestion, cough, and sore throat. States she was feeling better but decided to go to a wedding on Saturday where the AC was blasting. States she has started coughing with associated sore throat since that time.\par Denies fever, chill, mylagias, cp, sob, n/v/d, facial pain, difficulty swallowing/eating.\par Has tried mucinex with some relief. \par \par

## 2022-10-14 NOTE — ASSESSMENT
[FreeTextEntry1] : URI\par -patient appears non-toxic, no s/s of bacterial infection\par -symptoms not consistent with strep or sinusitis\par -continue supportive care: encourage hydration, use of humidifier, neti pot, rest\par -flonase sent\par -If worsening of symptoms, onset of SOB, chest pain, fevers that do not improve with pyretics, or inability to tolerate PO, patient advised to go to urgent care/ED. Patient verbalizes understanding.\par

## 2022-10-18 ENCOUNTER — NON-APPOINTMENT (OUTPATIENT)
Age: 82
End: 2022-10-18

## 2022-10-18 DIAGNOSIS — J06.9 ACUTE UPPER RESPIRATORY INFECTION, UNSPECIFIED: ICD-10-CM

## 2022-10-18 DIAGNOSIS — Z12.39 ENCOUNTER FOR OTHER SCREENING FOR MALIGNANT NEOPLASM OF BREAST: ICD-10-CM

## 2022-10-18 DIAGNOSIS — J98.8 OTHER SPECIFIED RESPIRATORY DISORDERS: ICD-10-CM

## 2022-10-22 ENCOUNTER — NON-APPOINTMENT (OUTPATIENT)
Age: 82
End: 2022-10-22

## 2022-10-24 ENCOUNTER — APPOINTMENT (OUTPATIENT)
Dept: RADIOLOGY | Facility: HOSPITAL | Age: 82
End: 2022-10-24

## 2022-11-07 ENCOUNTER — OUTPATIENT (OUTPATIENT)
Dept: OUTPATIENT SERVICES | Facility: HOSPITAL | Age: 82
LOS: 1 days | End: 2022-11-07
Payer: MEDICARE

## 2022-11-07 ENCOUNTER — RX RENEWAL (OUTPATIENT)
Age: 82
End: 2022-11-07

## 2022-11-07 ENCOUNTER — APPOINTMENT (OUTPATIENT)
Dept: CT IMAGING | Facility: HOSPITAL | Age: 82
End: 2022-11-07

## 2022-11-07 DIAGNOSIS — J38.7 OTHER DISEASES OF LARYNX: ICD-10-CM

## 2022-11-07 PROCEDURE — 70491 CT SOFT TISSUE NECK W/DYE: CPT

## 2022-11-07 PROCEDURE — 70491 CT SOFT TISSUE NECK W/DYE: CPT | Mod: 26,MH

## 2022-11-10 ENCOUNTER — APPOINTMENT (OUTPATIENT)
Dept: OTOLARYNGOLOGY | Facility: CLINIC | Age: 82
End: 2022-11-10

## 2022-11-10 VITALS
BODY MASS INDEX: 31.8 KG/M2 | WEIGHT: 162 LBS | HEART RATE: 74 BPM | HEIGHT: 60 IN | SYSTOLIC BLOOD PRESSURE: 125 MMHG | DIASTOLIC BLOOD PRESSURE: 69 MMHG

## 2022-11-10 PROCEDURE — 99214 OFFICE O/P EST MOD 30 MIN: CPT

## 2022-11-10 RX ORDER — DOXYCYCLINE HYCLATE 100 MG/1
100 CAPSULE ORAL
Qty: 20 | Refills: 0 | Status: COMPLETED | COMMUNITY
Start: 2022-10-15 | End: 2022-11-10

## 2022-11-10 RX ORDER — METHYLPREDNISOLONE 4 MG/1
4 TABLET ORAL
Qty: 21 | Refills: 0 | Status: COMPLETED | COMMUNITY
Start: 2022-10-23 | End: 2022-11-10

## 2022-11-10 NOTE — CONSULT LETTER
[Dear  ___] : Dear  [unfilled], [Courtesy Letter:] : I had the pleasure of seeing your patient, [unfilled], in my office today. [Please see my note below.] : Please see my note below. [Consult Closing:] : Thank you very much for allowing me to participate in the care of this patient.  If you have any questions, please do not hesitate to contact me. [Sincerely,] : Sincerely, [FreeTextEntry2] : Dr Chaz Gutiérrez  [FreeTextEntry3] : \par Tremayne Cleveland MD, FACS\par \par Otolaryngology-Head and Neck Surgery\par [employer] at [facility]\par

## 2022-11-10 NOTE — HISTORY OF PRESENT ILLNESS
[de-identified] : 82 yro pt referred by ENT Dr. Chaz Gutiérrez presents with follow up for L ventricle cyst.\par Pt states has been sick with cold, cough and sinus- Completed course of antibiotics and prednisone 11/05/22 \par She has reflux, controlled with omeprazole. \par Reports occasional voice hoarseness. \par Using Flonase 2x a day with partial relief. \par Denies dysphagia, dyspnea, dysphonia, or voice hoarseness. No fever, chills, weight loss. Eating and drinking without issues. \par pt is a retired [employer]. Reports social alcohol use. \par Denies h/o smoking. \par \par CT Neck 11/07/22 IMPRESSION:\par Well marginated hyperdense nodule centered on the left laryngeal ventricle (measures 1 x 0.7 x 0.6 cm in size).  Possibilities include a hyperdense cyst/laryngocele or a solid, enhancing lesion. Thin section dedicated contrast enhanced MR of the larynx may be helpful for further characterization as clinically warranted.\par No cervical adenopathy.\par \par \par \par \par \par

## 2022-11-14 ENCOUNTER — APPOINTMENT (OUTPATIENT)
Dept: INTERNAL MEDICINE | Facility: CLINIC | Age: 82
End: 2022-11-14

## 2022-11-14 VITALS
OXYGEN SATURATION: 97 % | WEIGHT: 160 LBS | SYSTOLIC BLOOD PRESSURE: 120 MMHG | HEIGHT: 65.5 IN | TEMPERATURE: 97.9 F | HEART RATE: 74 BPM | DIASTOLIC BLOOD PRESSURE: 70 MMHG | BODY MASS INDEX: 26.34 KG/M2

## 2022-11-14 DIAGNOSIS — R05.3 CHRONIC COUGH: ICD-10-CM

## 2022-11-14 DIAGNOSIS — Z87.19 PERSONAL HISTORY OF OTHER DISEASES OF THE DIGESTIVE SYSTEM: ICD-10-CM

## 2022-11-14 PROCEDURE — 99213 OFFICE O/P EST LOW 20 MIN: CPT

## 2022-11-14 NOTE — HISTORY OF PRESENT ILLNESS
[FreeTextEntry8] : Patient developed a cough in the middle of October and tested negative for COVID and was seen twice in urgent care and treated with Vibramycin for 10 days as well as to cough syrups.  Initially she had a productive cough with yellow sputum and wheezing and coarse breath sounds were heard which had improved by the second visit but at that time she was placed on a course of present.  This made her somewhat sick and the cough is persisted.  It is now dry.  She has nasal drip but no night sweats fever wheezing shortness of breath or other focalizing symptoms.  She was found to have a vocal cord nodule which is increased in size via CAT scan and this will need to be addressed later on.  She comes in for the persistent cough

## 2022-11-14 NOTE — ASSESSMENT
[FreeTextEntry1] : Patient has a chronic cough following a cold but her lungs sound clear at this time and she has no fever sweats.  She does appear to have a postnasal drip.  I will treat with Flonase for 10 days.  She is treated for GERD and remains on medication on a chronic basis.  If she does not get better on current medication we will consider getting a chest x-ray.  The cough may be secondary to the laryngeal nodule as well

## 2022-11-15 ENCOUNTER — RESULT REVIEW (OUTPATIENT)
Age: 82
End: 2022-11-15

## 2022-11-15 ENCOUNTER — APPOINTMENT (OUTPATIENT)
Dept: MAMMOGRAPHY | Facility: HOSPITAL | Age: 82
End: 2022-11-15

## 2022-11-15 ENCOUNTER — OUTPATIENT (OUTPATIENT)
Dept: OUTPATIENT SERVICES | Facility: HOSPITAL | Age: 82
LOS: 1 days | End: 2022-11-15
Payer: MEDICARE

## 2022-11-15 DIAGNOSIS — Z12.39 ENCOUNTER FOR OTHER SCREENING FOR MALIGNANT NEOPLASM OF BREAST: ICD-10-CM

## 2022-11-15 PROCEDURE — 77063 BREAST TOMOSYNTHESIS BI: CPT | Mod: 26

## 2022-11-15 PROCEDURE — 77063 BREAST TOMOSYNTHESIS BI: CPT

## 2022-11-15 PROCEDURE — 77067 SCR MAMMO BI INCL CAD: CPT

## 2022-11-15 PROCEDURE — 77067 SCR MAMMO BI INCL CAD: CPT | Mod: 26

## 2022-11-16 ENCOUNTER — TRANSCRIPTION ENCOUNTER (OUTPATIENT)
Age: 82
End: 2022-11-16

## 2022-11-16 VITALS — HEIGHT: 65.5 IN

## 2023-01-09 ENCOUNTER — NON-APPOINTMENT (OUTPATIENT)
Age: 83
End: 2023-01-09

## 2023-01-09 ENCOUNTER — TRANSCRIPTION ENCOUNTER (OUTPATIENT)
Age: 83
End: 2023-01-09

## 2023-01-10 ENCOUNTER — NON-APPOINTMENT (OUTPATIENT)
Age: 83
End: 2023-01-10

## 2023-01-10 ENCOUNTER — APPOINTMENT (OUTPATIENT)
Dept: DISASTER EMERGENCY | Facility: HOSPITAL | Age: 83
End: 2023-01-10

## 2023-01-11 ENCOUNTER — APPOINTMENT (OUTPATIENT)
Dept: INTERNAL MEDICINE | Facility: CLINIC | Age: 83
End: 2023-01-11

## 2023-01-11 ENCOUNTER — APPOINTMENT (OUTPATIENT)
Dept: DISASTER EMERGENCY | Facility: HOSPITAL | Age: 83
End: 2023-01-11

## 2023-01-12 ENCOUNTER — APPOINTMENT (OUTPATIENT)
Dept: DISASTER EMERGENCY | Facility: HOSPITAL | Age: 83
End: 2023-01-12

## 2023-01-18 ENCOUNTER — APPOINTMENT (OUTPATIENT)
Dept: OTOLARYNGOLOGY | Facility: CLINIC | Age: 83
End: 2023-01-18

## 2023-01-20 ENCOUNTER — APPOINTMENT (OUTPATIENT)
Dept: OTOLARYNGOLOGY | Facility: HOSPITAL | Age: 83
End: 2023-01-20

## 2023-02-02 ENCOUNTER — APPOINTMENT (OUTPATIENT)
Dept: OTOLARYNGOLOGY | Facility: CLINIC | Age: 83
End: 2023-02-02
Payer: MEDICARE

## 2023-02-02 ENCOUNTER — OUTPATIENT (OUTPATIENT)
Dept: OUTPATIENT SERVICES | Facility: HOSPITAL | Age: 83
LOS: 1 days | End: 2023-02-02
Payer: MEDICARE

## 2023-02-02 VITALS
SYSTOLIC BLOOD PRESSURE: 122 MMHG | DIASTOLIC BLOOD PRESSURE: 68 MMHG | HEIGHT: 64 IN | RESPIRATION RATE: 14 BRPM | WEIGHT: 164.91 LBS | OXYGEN SATURATION: 98 % | HEART RATE: 66 BPM | TEMPERATURE: 98 F

## 2023-02-02 VITALS
DIASTOLIC BLOOD PRESSURE: 81 MMHG | HEIGHT: 65.5 IN | HEART RATE: 73 BPM | WEIGHT: 162 LBS | SYSTOLIC BLOOD PRESSURE: 119 MMHG | BODY MASS INDEX: 26.67 KG/M2

## 2023-02-02 DIAGNOSIS — J38.7 OTHER DISEASES OF LARYNX: ICD-10-CM

## 2023-02-02 DIAGNOSIS — Z96.652 PRESENCE OF LEFT ARTIFICIAL KNEE JOINT: Chronic | ICD-10-CM

## 2023-02-02 LAB
A1C WITH ESTIMATED AVERAGE GLUCOSE RESULT: 6.1 % — HIGH (ref 4–5.6)
ANION GAP SERPL CALC-SCNC: 9 MMOL/L — SIGNIFICANT CHANGE UP (ref 7–14)
BUN SERPL-MCNC: 22 MG/DL — SIGNIFICANT CHANGE UP (ref 7–23)
CALCIUM SERPL-MCNC: 9.6 MG/DL — SIGNIFICANT CHANGE UP (ref 8.4–10.5)
CHLORIDE SERPL-SCNC: 102 MMOL/L — SIGNIFICANT CHANGE UP (ref 98–107)
CO2 SERPL-SCNC: 26 MMOL/L — SIGNIFICANT CHANGE UP (ref 22–31)
CREAT SERPL-MCNC: 0.89 MG/DL — SIGNIFICANT CHANGE UP (ref 0.5–1.3)
EGFR: 65 ML/MIN/1.73M2 — SIGNIFICANT CHANGE UP
ESTIMATED AVERAGE GLUCOSE: 128 — SIGNIFICANT CHANGE UP
GLUCOSE SERPL-MCNC: 108 MG/DL — HIGH (ref 70–99)
HCT VFR BLD CALC: 40.2 % — SIGNIFICANT CHANGE UP (ref 34.5–45)
HGB BLD-MCNC: 12.8 G/DL — SIGNIFICANT CHANGE UP (ref 11.5–15.5)
MCHC RBC-ENTMCNC: 30.8 PG — SIGNIFICANT CHANGE UP (ref 27–34)
MCHC RBC-ENTMCNC: 31.8 GM/DL — LOW (ref 32–36)
MCV RBC AUTO: 96.6 FL — SIGNIFICANT CHANGE UP (ref 80–100)
NRBC # BLD: 0 /100 WBCS — SIGNIFICANT CHANGE UP (ref 0–0)
NRBC # FLD: 0 K/UL — SIGNIFICANT CHANGE UP (ref 0–0)
PLATELET # BLD AUTO: 350 K/UL — SIGNIFICANT CHANGE UP (ref 150–400)
POTASSIUM SERPL-MCNC: 4.2 MMOL/L — SIGNIFICANT CHANGE UP (ref 3.5–5.3)
POTASSIUM SERPL-SCNC: 4.2 MMOL/L — SIGNIFICANT CHANGE UP (ref 3.5–5.3)
RBC # BLD: 4.16 M/UL — SIGNIFICANT CHANGE UP (ref 3.8–5.2)
RBC # FLD: 13.9 % — SIGNIFICANT CHANGE UP (ref 10.3–14.5)
SODIUM SERPL-SCNC: 137 MMOL/L — SIGNIFICANT CHANGE UP (ref 135–145)
WBC # BLD: 11.57 K/UL — HIGH (ref 3.8–10.5)
WBC # FLD AUTO: 11.57 K/UL — HIGH (ref 3.8–10.5)

## 2023-02-02 PROCEDURE — 31575 DIAGNOSTIC LARYNGOSCOPY: CPT

## 2023-02-02 PROCEDURE — 93010 ELECTROCARDIOGRAM REPORT: CPT

## 2023-02-02 PROCEDURE — 99214 OFFICE O/P EST MOD 30 MIN: CPT | Mod: 25

## 2023-02-02 RX ORDER — SODIUM CHLORIDE 9 MG/ML
1000 INJECTION, SOLUTION INTRAVENOUS
Refills: 0 | Status: DISCONTINUED | OUTPATIENT
Start: 2023-02-13 | End: 2023-02-28

## 2023-02-02 NOTE — CONSULT LETTER
[Dear  ___] : Dear  [unfilled], [Courtesy Letter:] : I had the pleasure of seeing your patient, [unfilled], in my office today. [Please see my note below.] : Please see my note below. [Consult Closing:] : Thank you very much for allowing me to participate in the care of this patient.  If you have any questions, please do not hesitate to contact me. [Sincerely,] : Sincerely, [FreeTextEntry2] : Dr Chaz Gutiérrez  [FreeTextEntry3] : \par Tremayne Cleveland MD, FACS\par \par Otolaryngology-Head and Neck Surgery\par [employer] [facility]\par

## 2023-02-02 NOTE — HISTORY OF PRESENT ILLNESS
[de-identified] : 82 year old woman pt was referred by ENT Dr. Chaz Gutiérrez presents with follow up for L ventricle cyst.\par COVID + 01/2022--treated with Remdesivir \par Hx of GERD--Takes Omeprazole daily. \par Reports more frequent voice hoarseness. \par Takes Flonase and Allegra PRN.\par Denies complete loss of voice, dysphagia, odynophagia, dyspnea, recent fevers or throat infections. No fever, chills, weight loss. Eating and drinking without issues. 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 Denies h/o smoking. \par \par CT Neck 11/07/22 IMPRESSION:\par Well marginated hyperdense nodule centered on the left laryngeal ventricle (measures 1 x 0.7 x 0.6 cm in size).  Possibilities include a hyperdense cyst/laryngocele or a solid, enhancing lesion. Thin section dedicated contrast enhanced MR of the larynx may be helpful for further characterization as clinically warranted.\par No cervical adenopathy.\par \par \par \par \par \par

## 2023-02-02 NOTE — REASON FOR VISIT
[Subsequent Evaluation] : a subsequent evaluation for [Spouse] : spouse [FreeTextEntry2] : Left ventricle cyst.

## 2023-02-02 NOTE — H&P PST ADULT - ENDOCRINE COMMENTS
dm type 2 doesn't remember bs dm type 2 doesn't remember bs, hypothyroidism always on the same dose of levothyroxine

## 2023-02-02 NOTE — H&P PST ADULT - NEGATIVE ENMT SYMPTOMS
no ear pain/no tinnitus/no vertigo/no sinus symptoms/no nasal congestion/no nasal discharge/no nasal obstruction/no nose bleeds/no recurrent cold sores/no abnormal taste sensation/no gum bleeding/no dry mouth/no throat pain/no dysphagia

## 2023-02-02 NOTE — H&P PST ADULT - NSICDXPASTMEDICALHX_GEN_ALL_CORE_FT
PAST MEDICAL HISTORY:  Anxiety     Depression     Hyperlipidemia     Hypothyroidism     Other diseases of larynx     Type 2 diabetes mellitus      PAST MEDICAL HISTORY:  Anxiety     Depression     GERD (gastroesophageal reflux disease)     Hyperlipidemia     Hypothyroidism     Other diseases of larynx     Type 2 diabetes mellitus

## 2023-02-02 NOTE — H&P PST ADULT - HISTORY OF PRESENT ILLNESS
83y/o female scheduled for direct laryngoscopy with excision of tumor on 2/13/2023.  Pt states, "was evaluated by ent 6 months ago identified cyst on the vocal cord,  recent f/u shows increase in size."    Pt tested positive with a home covid test with cough with phelgm on 1/9/2023, Went to [facility] for remdesivir infusions, covid test was not retested. 83y/o female scheduled for direct laryngoscopy with excision of tumor on 2/13/2023.  Pt states, "was evaluated by ent 6 months ago identified cyst on the vocal cord,  recent f/u shows increase in size."    Pt tested positive covid test with cough with phelgm on 1/11/2023, Went to [facility] for remdesivir infusions. 81y/o female scheduled for direct laryngoscopy with excision of tumor on 2/13/2023.  Pt states, "was evaluated by ent 6 months ago identified cyst on the vocal cord,  recent f/u shows increase in size."    Pt tested positive covid test with cough with phelgm on 1/11/2023, Went to [facility] for remdesivir infusions.- copy of results are in the chart

## 2023-02-02 NOTE — H&P PST ADULT - ENMT COMMENTS
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

## 2023-02-02 NOTE — H&P PST ADULT - PROBLEM SELECTOR PLAN 1
Pt scheduled for direct laryngoscopy with excision of tumor on 2/13/2023.  labs done results pending, ekg done results pending, Preop teaching done, pt able to verbalize understanding.    medications day of procedure-  omeprazole, levothyroxine, Wellbutrin  pst request   pt scheduled for medical eval as per surgeon, pst will also request.  Dr. Kd Judd [phone], fax [phone]

## 2023-02-02 NOTE — H&P PST ADULT - GASTROINTESTINAL
normal/soft/nontender/nondistended/normal active bowel sounds/no guarding/no rigidity/no organomegaly/no palpable warren/no masses palpable negative

## 2023-02-06 ENCOUNTER — TRANSCRIPTION ENCOUNTER (OUTPATIENT)
Age: 83
End: 2023-02-06

## 2023-02-08 ENCOUNTER — APPOINTMENT (OUTPATIENT)
Dept: INTERNAL MEDICINE | Facility: CLINIC | Age: 83
End: 2023-02-08
Payer: MEDICARE

## 2023-02-08 VITALS
SYSTOLIC BLOOD PRESSURE: 100 MMHG | OXYGEN SATURATION: 98 % | HEART RATE: 75 BPM | DIASTOLIC BLOOD PRESSURE: 70 MMHG | TEMPERATURE: 97.7 F

## 2023-02-08 PROBLEM — E11.9 TYPE 2 DIABETES MELLITUS WITHOUT COMPLICATIONS: Chronic | Status: ACTIVE | Noted: 2023-02-02

## 2023-02-08 PROBLEM — E03.9 HYPOTHYROIDISM, UNSPECIFIED: Chronic | Status: ACTIVE | Noted: 2023-02-02

## 2023-02-08 PROBLEM — F41.9 ANXIETY DISORDER, UNSPECIFIED: Chronic | Status: ACTIVE | Noted: 2023-02-02

## 2023-02-08 PROBLEM — E78.5 HYPERLIPIDEMIA, UNSPECIFIED: Chronic | Status: ACTIVE | Noted: 2023-02-02

## 2023-02-08 PROBLEM — F32.A DEPRESSION, UNSPECIFIED: Chronic | Status: ACTIVE | Noted: 2023-02-02

## 2023-02-08 PROBLEM — J38.7 OTHER DISEASES OF LARYNX: Chronic | Status: ACTIVE | Noted: 2023-02-02

## 2023-02-08 PROBLEM — K21.9 GASTRO-ESOPHAGEAL REFLUX DISEASE WITHOUT ESOPHAGITIS: Chronic | Status: ACTIVE | Noted: 2023-02-02

## 2023-02-08 PROCEDURE — 99215 OFFICE O/P EST HI 40 MIN: CPT | Mod: 25

## 2023-02-08 PROCEDURE — 82270 OCCULT BLOOD FECES: CPT

## 2023-02-08 NOTE — HISTORY OF PRESENT ILLNESS
[No Pertinent Cardiac History] : no history of aortic stenosis, atrial fibrillation, coronary artery disease, recent myocardial infarction, or implantable device/pacemaker [No Pertinent Pulmonary History] : no history of asthma, COPD, sleep apnea, or smoking [No Adverse Anesthesia Reaction] : no adverse anesthesia reaction in self or family member [Diabetes] : diabetes [(Patient denies any chest pain, claudication, dyspnea on exertion, orthopnea, palpitations or syncope)] : Patient denies any chest pain, claudication, dyspnea on exertion, orthopnea, palpitations or syncope [Chronic Anticoagulation] : no chronic anticoagulation [Chronic Kidney Disease] : no chronic kidney disease [FreeTextEntry1] : Laryngeal mass [FreeTextEntry4] : The patient has an ENT work-up with laryngoscopy which showed a laryngeal mass which is followed and now the patient is going to get definitive diagnosis and treatment [FreeTextEntry7] : EKG was done EKG showed no change and shows left axis deviation tall R wave in the elbow consistent with voltage criteria for LVH.  There is is tall and R wave in V1 is previously.  Otherwise the cardiogram is unchanged

## 2023-02-08 NOTE — PHYSICAL EXAM
[Normal Female:] : bladder was normal on palpation [Normal] : no CVA tenderness, no spinal tenderness [Comprehensive Foot Exam Normal] : Right and left foot were examined and both feet are normal. No ulcers in either foot. Toes are normal and with full ROM.  Normal tactile sensation with monofilament testing throughout both feet [de-identified] : Mild increased expiratory phase [de-identified] : Mild cystic changes at 12:00 in the left breast/right without findings [FreeTextEntry1] : Guaiac negative no polyps [de-identified] : No significant adenopathy [de-identified] : Thyroid is not palpable [de-identified] : Status post right knee replacement [de-identified] : No significant lesion [de-identified] : Nonfocal

## 2023-02-08 NOTE — PHYSICAL EXAM
[Normal Female:] : bladder was normal on palpation [Normal] : no CVA tenderness, no spinal tenderness [Comprehensive Foot Exam Normal] : Right and left foot were examined and both feet are normal. No ulcers in either foot. Toes are normal and with full ROM.  Normal tactile sensation with monofilament testing throughout both feet [de-identified] : Mild increased expiratory phase [de-identified] : Mild cystic changes at 12:00 in the left breast/right without findings [FreeTextEntry1] : Guaiac negative no polyps [de-identified] : No significant adenopathy [de-identified] : Thyroid is not palpable [de-identified] : Status post right knee replacement [de-identified] : No significant lesion [de-identified] : Nonfocal

## 2023-02-08 NOTE — ASSESSMENT
[FreeTextEntry4] : The patient is cleared for the proposed surgery.  She will hold aspirinNSAIODS over-the-counter medications and vitamins 1 week prior to the surgery.  On the day of surgery she will hold Provigil.  She has taken her Ozempic on Tuesday and no further medicines need to be taken for diabetes.  During the surgery she have a KVO with D5W.  She can take all her other usual medications on schedule

## 2023-02-09 ENCOUNTER — APPOINTMENT (OUTPATIENT)
Dept: OTOLARYNGOLOGY | Facility: CLINIC | Age: 83
End: 2023-02-09

## 2023-02-10 NOTE — ASU PATIENT PROFILE, ADULT - NSICDXPASTMEDICALHX_GEN_ALL_CORE_FT
PAST MEDICAL HISTORY:  Anxiety     Depression     GERD (gastroesophageal reflux disease)     Hyperlipidemia     Hypothyroidism     Other diseases of larynx     Type 2 diabetes mellitus

## 2023-02-12 ENCOUNTER — TRANSCRIPTION ENCOUNTER (OUTPATIENT)
Age: 83
End: 2023-02-12

## 2023-02-13 ENCOUNTER — TRANSCRIPTION ENCOUNTER (OUTPATIENT)
Age: 83
End: 2023-02-13

## 2023-02-13 ENCOUNTER — RESULT REVIEW (OUTPATIENT)
Age: 83
End: 2023-02-13

## 2023-02-13 ENCOUNTER — APPOINTMENT (OUTPATIENT)
Dept: OTOLARYNGOLOGY | Facility: HOSPITAL | Age: 83
End: 2023-02-13

## 2023-02-13 ENCOUNTER — OUTPATIENT (OUTPATIENT)
Dept: OUTPATIENT SERVICES | Facility: HOSPITAL | Age: 83
LOS: 1 days | Discharge: HOME | End: 2023-02-13
Payer: MEDICARE

## 2023-02-13 VITALS
DIASTOLIC BLOOD PRESSURE: 79 MMHG | SYSTOLIC BLOOD PRESSURE: 151 MMHG | HEART RATE: 69 BPM | TEMPERATURE: 97 F | RESPIRATION RATE: 16 BRPM | OXYGEN SATURATION: 96 %

## 2023-02-13 VITALS
HEART RATE: 70 BPM | OXYGEN SATURATION: 97 % | TEMPERATURE: 98 F | WEIGHT: 164.91 LBS | RESPIRATION RATE: 16 BRPM | HEIGHT: 64 IN | DIASTOLIC BLOOD PRESSURE: 66 MMHG | SYSTOLIC BLOOD PRESSURE: 146 MMHG

## 2023-02-13 DIAGNOSIS — J38.3 OTHER DISEASES OF VOCAL CORDS: ICD-10-CM

## 2023-02-13 DIAGNOSIS — Z96.652 PRESENCE OF LEFT ARTIFICIAL KNEE JOINT: Chronic | ICD-10-CM

## 2023-02-13 DIAGNOSIS — J38.7 OTHER DISEASES OF LARYNX: ICD-10-CM

## 2023-02-13 LAB
GLUCOSE BLDC GLUCOMTR-MCNC: 93 MG/DL — SIGNIFICANT CHANGE UP (ref 70–99)
SARS-COV-2 N GENE NPH QL NAA+PROBE: DETECTED

## 2023-02-13 PROCEDURE — 31541 LARYNSCOP W/TUMR EXC + SCOPE: CPT

## 2023-02-13 PROCEDURE — 88305 TISSUE EXAM BY PATHOLOGIST: CPT | Mod: 26

## 2023-02-13 RX ORDER — BUPROPION HYDROCHLORIDE 150 MG/1
1 TABLET, EXTENDED RELEASE ORAL
Qty: 0 | Refills: 0 | DISCHARGE

## 2023-02-13 RX ORDER — SEMAGLUTIDE 0.68 MG/ML
1 INJECTION, SOLUTION SUBCUTANEOUS
Qty: 0 | Refills: 0 | DISCHARGE

## 2023-02-13 RX ORDER — ASPIRIN/CALCIUM CARB/MAGNESIUM 324 MG
1 TABLET ORAL
Qty: 0 | Refills: 0 | DISCHARGE

## 2023-02-13 RX ORDER — ALPRAZOLAM 0.25 MG
1 TABLET ORAL
Qty: 0 | Refills: 0 | DISCHARGE

## 2023-02-13 RX ORDER — FENTANYL CITRATE 50 UG/ML
25 INJECTION INTRAVENOUS
Refills: 0 | Status: DISCONTINUED | OUTPATIENT
Start: 2023-02-13 | End: 2023-02-13

## 2023-02-13 RX ORDER — MODAFINIL 200 MG/1
1 TABLET ORAL
Qty: 0 | Refills: 0 | DISCHARGE

## 2023-02-13 RX ORDER — LEVOTHYROXINE SODIUM 125 MCG
1 TABLET ORAL
Qty: 0 | Refills: 0 | DISCHARGE

## 2023-02-13 RX ORDER — OXYCODONE HYDROCHLORIDE 5 MG/1
5 TABLET ORAL ONCE
Refills: 0 | Status: DISCONTINUED | OUTPATIENT
Start: 2023-02-13 | End: 2023-02-13

## 2023-02-13 RX ORDER — OMEPRAZOLE 10 MG/1
1 CAPSULE, DELAYED RELEASE ORAL
Qty: 0 | Refills: 0 | DISCHARGE

## 2023-02-13 RX ORDER — ZOLPIDEM TARTRATE 10 MG/1
1 TABLET ORAL
Qty: 0 | Refills: 0 | DISCHARGE

## 2023-02-13 RX ORDER — SERTRALINE 25 MG/1
50 TABLET, FILM COATED ORAL
Qty: 0 | Refills: 0 | DISCHARGE

## 2023-02-13 RX ORDER — HYDRALAZINE HCL 50 MG
10 TABLET ORAL ONCE
Refills: 0 | Status: DISCONTINUED | OUTPATIENT
Start: 2023-02-13 | End: 2023-02-28

## 2023-02-13 RX ORDER — ROSUVASTATIN CALCIUM 5 MG/1
1 TABLET ORAL
Qty: 0 | Refills: 0 | DISCHARGE

## 2023-02-13 RX ADMIN — FENTANYL CITRATE 25 MICROGRAM(S): 50 INJECTION INTRAVENOUS at 17:12

## 2023-02-13 RX ADMIN — FENTANYL CITRATE 25 MICROGRAM(S): 50 INJECTION INTRAVENOUS at 16:58

## 2023-02-13 NOTE — ASU DISCHARGE PLAN (ADULT/PEDIATRIC) - CARE PROVIDER_API CALL
Tremayne Cleveland)  [facility]; Otolaryngology  [address] [place] [address]  Phone: [phone]  Fax: [phone]  Follow Up Time:

## 2023-02-13 NOTE — ASU PREOP CHECKLIST - COMMENTS
No cyanosis, clubbing or edema No cyanosis, clubbing or edema No cyanosis, clubbing or edema Sip of water with Wellbutrin omeprazole and synthroid at 9am

## 2023-02-13 NOTE — ASU PREOP CHECKLIST - SITE MARKED BY ANESTHESIOLOGIST
I have reviewed the chart of Bc Giordano and I have interviewed and examined the patient.  I agree with the PA's note, assessment and treatment plan.        Brief History:  10:52 AM Bc Giordano is a 79 year old male who presents to the ED for evaluation of mechanical fall that occurred 6 days ago.  Clearly tripped.  Has shoulder, neck and head pain.  No other symptoms and otherwise feels fine.  Denies passing out or dizziness at time of fall.        Brief Physical:  GCS 15.  In no distress.  No deformities on MSK exam.  Strong peripheral pulses.  Abdomen non tender, non distended.  No flank ecchymosis.  No flank tenderness.    Vitals  Vitals:    06/19/20 0847 06/19/20 0920 06/19/20 0930   BP: 128/74  (!) 151/72   Pulse: 71  69   Resp: 12  16   Temp: 98.3 °F (36.8 °C)     TempSrc: Oral     SpO2: 96% 98% 95%   Weight: 90.7 kg         ED Course as of Jun 20 0615 Fri Jun 19, 2020   1108 BP 80/51.  Patient reports feeling great.  Mental status is wnl.  HR sinus dax in 50's.  On BB.    [ANUSHA]   1131 BP 92/51 - Pt denies any symptoms feels fine.    [ANUSHA]   1154 BP much improved.  I suspect Tizanidine unfortunately dropped pressure and HR, especially as patient is on BB already.      [ANUSHA]   1336 BP much improved after obs / hydration.  Suspect tizanidine alpha 2 effect, as taking beta blocker as well.  Has no symptoms now.  Ambulated without diffictuly.  Actually asking for the tizanidine as it really helped is pain.    [ANUSHA]      ED Course User Index  [ANUSHA] Lincoln Shah MD           MDM  Patient with normal vitals upon arrival, and unremarkable imaging to evaluate fall, although just over 1 hour after given Tizanidine patient became hypotensive, mildly dax, and slightly lightheaded.  Had no other symptoms, and felt otherwise very well.  EKG / labs / rectal exam / physical exam unremarkable for significant abnormalities to explain vitals.  After IVF, BP return to normal.  HR improved to normal.  Patient is on  Beta blocker, and I suspect Tizanidine unfortunately dropped BP and HR as medication side effect (alpha antagonism).  After observation, while completely asymptomatic, and return of normal BP, and with normal ambulation, it was felt safe for patient to discharge home at this time.  Other etiologies of hypotension considered, especially bleeding secondary to trauma, although do not suspect any.      Critical Care time spent on this patient outside of billable procedures:  None          ____________________________________________________________________      Dr. Lincoln Shah  Dictation # [id number]         Lincoln Shah MD  06/20/20 0620     n/a

## 2023-02-13 NOTE — ASU DISCHARGE PLAN (ADULT/PEDIATRIC) - CLICK TO LAUNCH ORM
TRANSFER - OUT REPORT:     Verbal report given to: CPRU. Report consisted of patient's Situation, Background, Assessment and   Recommendations(SBAR). Opportunity for questions and clarification was provided. Patient transported with a Cardiac Cath Tech / Patient Care Tech. .

## 2023-02-13 NOTE — ASU PREOP CHECKLIST - BOWEL PREP
MD Notification    Notified Person: MD    Notified Person Name: Vidal    Notification Date/Time: 11/18/21 11:25 PM     Notification Interaction: Verbal    Purpose of Notification: Blood culture 11/15 positive for MRSA    Orders Received: ackowledged    Comments:       n/a

## 2023-02-13 NOTE — ASU DISCHARGE PLAN (ADULT/PEDIATRIC) - NURSING INSTRUCTIONS
DO NOT take any Tylenol (Acetaminophen) or narcotics containing Tylenol until after 9:45pm . You received Tylenol during your operation and it can cause damage to your liver if too much is taken within a 24 hour time period.

## 2023-02-15 LAB — SURGICAL PATHOLOGY STUDY: SIGNIFICANT CHANGE UP

## 2023-02-15 NOTE — PROCEDURE
[Fine Needle Aspiration] : Fine needle aspiration ~T ~C was performed. [Area of Mass: ______] : mass identified in the [unfilled] No [Risks] : risks [Patient] : the patient [Benefits] : benefits [Consent Obtained] : Written consent was obtained prior to the procedure and is detailed in the patient's record [Ethyl Chloride] : ethyl chloride [Lidocaine Cream] : lidocaine cream [Supine] : The patient was placed in the supine position with the neck extended as tolerated. [Alcohol] : with alcohol [27 gauge 1.5 inch] : A 27 gauge 1.5 inch needle was used [____ Passes] : [unfilled] passes were made through the mass [Ultrasonic Guidance] : ultrasound guidance was employed [Sent to Histology] : The specimens were prepared in the usual manner and sent to histology. [Thyroseq] : Thyroseq [Tolerated Well] : the patient tolerated the procedure well [Vital Signs Stable] : the vital signs were stable [Hemostasis] : hemostasis was assured and the patient was discharged in satisfactory condition [No Complications] : There were no complications

## 2023-02-16 ENCOUNTER — NON-APPOINTMENT (OUTPATIENT)
Age: 83
End: 2023-02-16

## 2023-03-02 ENCOUNTER — RX RENEWAL (OUTPATIENT)
Age: 83
End: 2023-03-02

## 2023-03-08 ENCOUNTER — APPOINTMENT (OUTPATIENT)
Dept: INTERNAL MEDICINE | Facility: CLINIC | Age: 83
End: 2023-03-08

## 2023-03-09 ENCOUNTER — APPOINTMENT (OUTPATIENT)
Dept: OTOLARYNGOLOGY | Facility: CLINIC | Age: 83
End: 2023-03-09
Payer: MEDICARE

## 2023-03-09 DIAGNOSIS — E07.9 DISORDER OF THYROID, UNSPECIFIED: ICD-10-CM

## 2023-03-09 PROCEDURE — 99214 OFFICE O/P EST MOD 30 MIN: CPT

## 2023-03-09 NOTE — CONSULT LETTER
[Dear  ___] : Dear  [unfilled], [Courtesy Letter:] : I had the pleasure of seeing your patient, [unfilled], in my office today. [Please see my note below.] : Please see my note below. [Consult Closing:] : Thank you very much for allowing me to participate in the care of this patient.  If you have any questions, please do not hesitate to contact me. [Sincerely,] : Sincerely, [FreeTextEntry2] : Dr Chaz Gutiérrez  [FreeTextEntry3] : \par Tremayne Cleveland MD, FACS\par \par Otolaryngology-Head and Neck Surgery\par [employer] [facility]\par

## 2023-03-09 NOTE — HISTORY OF PRESENT ILLNESS
[de-identified] : 82 year old woman pt was referred by ENT Dr. Chaz Gutiérrez presents with follow up for L ventricle cyst.\par s/p resection of L laryngeal lesion on 2/13/23, path showed laryngeal cyst.  PT has a feeling of a thick tongue and phlegm.  Denies hoarseness and dysphagia.  PT suddenly lost her  last week of a heart attack.

## 2023-04-06 ENCOUNTER — RX RENEWAL (OUTPATIENT)
Age: 83
End: 2023-04-06

## 2023-04-13 ENCOUNTER — NON-APPOINTMENT (OUTPATIENT)
Age: 83
End: 2023-04-13

## 2023-04-13 ENCOUNTER — APPOINTMENT (OUTPATIENT)
Dept: INTERNAL MEDICINE | Facility: CLINIC | Age: 83
End: 2023-04-13
Payer: MEDICARE

## 2023-04-13 VITALS
HEART RATE: 63 BPM | HEIGHT: 65 IN | WEIGHT: 153 LBS | BODY MASS INDEX: 25.49 KG/M2 | SYSTOLIC BLOOD PRESSURE: 120 MMHG | DIASTOLIC BLOOD PRESSURE: 70 MMHG | TEMPERATURE: 98 F | OXYGEN SATURATION: 96 %

## 2023-04-13 DIAGNOSIS — Z00.00 ENCOUNTER FOR GENERAL ADULT MEDICAL EXAMINATION W/OUT ABNORMAL FINDINGS: ICD-10-CM

## 2023-04-13 PROCEDURE — 93000 ELECTROCARDIOGRAM COMPLETE: CPT | Mod: 59

## 2023-04-13 PROCEDURE — 82270 OCCULT BLOOD FECES: CPT

## 2023-04-13 PROCEDURE — G0439: CPT

## 2023-04-13 PROCEDURE — 36415 COLL VENOUS BLD VENIPUNCTURE: CPT

## 2023-04-13 RX ORDER — SERTRALINE HYDROCHLORIDE 50 MG/1
50 TABLET, FILM COATED ORAL DAILY
Refills: 0 | Status: ACTIVE | COMMUNITY
Start: 2023-04-13

## 2023-04-13 RX ORDER — SERTRALINE HYDROCHLORIDE 50 MG/1
50 TABLET, FILM COATED ORAL DAILY
Qty: 90 | Refills: 3 | Status: DISCONTINUED | COMMUNITY
Start: 2020-08-20 | End: 2023-04-13

## 2023-04-13 RX ORDER — ALPRAZOLAM 0.25 MG/1
0.25 TABLET ORAL
Qty: 15 | Refills: 0 | Status: ACTIVE | COMMUNITY
Start: 2023-04-13

## 2023-04-13 NOTE — HISTORY OF PRESENT ILLNESS
[de-identified] : The patient is a 82-year-old female who comes in for complete physical examination.  Sadly she has recently lost her  which is exacerbated her anxiety depression and she is following through with  who has increased her sertraline and she had Xanax added to her medication and she is also on bupropion.  This is helped her to some degree.  She is diabetic and her sugars appear to be under good control by Dexcom.  She is taking Ozempic and has lost weight.  She has had her eyes checked and there are no diabetic changes.  She occasionally get short of breath when exerting herself but denies chest pain palpitations swelling or fainting.  She is a non-smoker she has no GI symptoms and is up-to-date on colonoscopy and has no changes in her bowel.  She has no polydipsia polyuria polyphagia history of urinary tract infections or hematuria.  She uses local estrogens for vaginal dryness but has no other gynecologic symptoms and is up-to-date on mammograms as well as all vaccinations.  She has a mild tremor of the right hand which was not felt to be parkinsonian.  She will continue to be followed by neurology with these exception she has been in good health and will see cardiology for further evaluation and possible stress testing

## 2023-04-13 NOTE — REVIEW OF SYSTEMS
[Dyspnea on Exertion] : dyspnea on exertion [Negative] : Heme/Lymph [de-identified] : Tremor of the right hand and hand writing is less legible

## 2023-04-13 NOTE — ASSESSMENT
[FreeTextEntry1] : The patient recently lost her  and is under the care of psychiatry for anxiety and depression as well.  Her medications have been increased.  She is treated for chronic GERD high cholesterol which we are checking hypothyroidism which we are checking her TFTs and type 2 diabetes mellitus on Ozempic.  We are checking her glucose hemoglobin A1c lipids CPK liver functions as well as all screening blood tests.  EKG is checked and is found to show possible old inferior wall MI and if to R wave in V2 possibly consistent with old posterior wall MI.  She has voltage criteria for LVH and no other significant findings.  We will have her see cardiology as she has dyspnea on exertion as well

## 2023-04-13 NOTE — PHYSICAL EXAM
[Normal] : no CVA tenderness, no spinal tenderness [Comprehensive Foot Exam Normal] : Right and left foot were examined and both feet are normal. No ulcers in either foot. Toes are normal and with full ROM.  Normal tactile sensation with monofilament testing throughout both feet [de-identified] : The patient has had a biopsy of her larynx which was negative [de-identified] : No masses [FreeTextEntry1] : Guaiac negative without polyps [de-identified] : No adenopathy [de-identified] : Thyroid is normal [de-identified] : Mild osteoarthritic changes of the hands [de-identified] : No significant lesions [de-identified] : Mild tremor of the right hand without cogwheeling or masked facies or change in gait/mild imbalance but Romberg is negative

## 2023-04-13 NOTE — HEALTH RISK ASSESSMENT
[1] : 2) Feeling down, depressed, or hopeless for several days (1) [I have developed a follow-up plan documented below in the note.] : I have developed a follow-up plan documented below in the note. [[id number]] : 2

## 2023-04-14 LAB
ALBUMIN SERPL ELPH-MCNC: 4.5 G/DL
ALP BLD-CCNC: 86 U/L
ALT SERPL-CCNC: 17 U/L
ANION GAP SERPL CALC-SCNC: 12 MMOL/L
APPEARANCE: CLEAR
AST SERPL-CCNC: 17 U/L
BACTERIA: NEGATIVE
BASOPHILS # BLD AUTO: 0.03 K/UL
BASOPHILS NFR BLD AUTO: 0.3 %
BILIRUB SERPL-MCNC: 0.2 MG/DL
BILIRUBIN URINE: NEGATIVE
BLOOD URINE: NEGATIVE
BUN SERPL-MCNC: 16 MG/DL
CALCIUM SERPL-MCNC: 9.7 MG/DL
CHLORIDE SERPL-SCNC: 105 MMOL/L
CHOLEST SERPL-MCNC: 132 MG/DL
CK SERPL-CCNC: 65 U/L
CO2 SERPL-SCNC: 24 MMOL/L
COLOR: YELLOW
CREAT SERPL-MCNC: 0.91 MG/DL
CRP SERPL HS-MCNC: 5.76 MG/L
EGFR: 63 ML/MIN/1.73M2
EOSINOPHIL # BLD AUTO: 0.25 K/UL
EOSINOPHIL NFR BLD AUTO: 2.8 %
ESTIMATED AVERAGE GLUCOSE: 128 MG/DL
GLUCOSE QUALITATIVE U: NEGATIVE
GLUCOSE SERPL-MCNC: 112 MG/DL
HBA1C MFR BLD HPLC: 6.1 %
HCT VFR BLD CALC: 41.1 %
HDLC SERPL-MCNC: 55 MG/DL
HGB BLD-MCNC: 13.1 G/DL
HYALINE CASTS: 4 /LPF
IMM GRANULOCYTES NFR BLD AUTO: 0.4 %
KETONES URINE: NEGATIVE
LDLC SERPL CALC-MCNC: 52 MG/DL
LEUKOCYTE ESTERASE URINE: ABNORMAL
LYMPHOCYTES # BLD AUTO: 2.13 K/UL
LYMPHOCYTES NFR BLD AUTO: 23.9 %
MAN DIFF?: NORMAL
MCHC RBC-ENTMCNC: 31.6 PG
MCHC RBC-ENTMCNC: 31.9 GM/DL
MCV RBC AUTO: 99 FL
MICROSCOPIC-UA: NORMAL
MONOCYTES # BLD AUTO: 0.94 K/UL
MONOCYTES NFR BLD AUTO: 10.6 %
NEUTROPHILS # BLD AUTO: 5.51 K/UL
NEUTROPHILS NFR BLD AUTO: 62 %
NITRITE URINE: NEGATIVE
NONHDLC SERPL-MCNC: 77 MG/DL
PH URINE: 6
PLATELET # BLD AUTO: 340 K/UL
POTASSIUM SERPL-SCNC: 4.6 MMOL/L
PROT SERPL-MCNC: 6.4 G/DL
PROTEIN URINE: NORMAL
RBC # BLD: 4.15 M/UL
RBC # FLD: 14.7 %
RED BLOOD CELLS URINE: 2 /HPF
SODIUM SERPL-SCNC: 141 MMOL/L
SPECIFIC GRAVITY URINE: 1.02
SQUAMOUS EPITHELIAL CELLS: 3 /HPF
T4 SERPL-MCNC: 5.6 UG/DL
TRIGL SERPL-MCNC: 126 MG/DL
TSH SERPL-ACNC: 2.57 UIU/ML
UROBILINOGEN URINE: NORMAL
WBC # FLD AUTO: 8.9 K/UL
WHITE BLOOD CELLS URINE: 15 /HPF

## 2023-04-17 ENCOUNTER — APPOINTMENT (OUTPATIENT)
Dept: CARDIOLOGY | Facility: CLINIC | Age: 83
End: 2023-04-17
Payer: MEDICARE

## 2023-04-17 ENCOUNTER — NON-APPOINTMENT (OUTPATIENT)
Age: 83
End: 2023-04-17

## 2023-04-17 VITALS
OXYGEN SATURATION: 95 % | HEART RATE: 70 BPM | HEIGHT: 65 IN | SYSTOLIC BLOOD PRESSURE: 128 MMHG | WEIGHT: 153 LBS | BODY MASS INDEX: 25.49 KG/M2 | DIASTOLIC BLOOD PRESSURE: 81 MMHG

## 2023-04-17 PROCEDURE — 93000 ELECTROCARDIOGRAM COMPLETE: CPT

## 2023-04-17 PROCEDURE — 99214 OFFICE O/P EST MOD 30 MIN: CPT

## 2023-04-17 NOTE — PHYSICAL EXAM
[Normal Rate] : normal [Rhythm Regular] : regular [Normal S1] : normal S1 [Normal] : moves all extremities, no focal deficits, normal speech [Appears Anxious] : appears anxious

## 2023-04-17 NOTE — REVIEW OF SYSTEMS
[Dyspnea on exertion] : dyspnea during exertion [Depression] : depression [Anxiety] : anxiety [Under Stress] : under stress [Negative] : Neurological

## 2023-04-18 NOTE — ASSESSMENT
[FreeTextEntry1] : 83 yo here for first visit as [facility].\par \par Overall very health . Signifiacnt grief after passing of her . She hasn’t been particulalry active. Hx of t2dm, hypothyroidism,  obesity and hyperlipidemia.\par \par Most likely dx is related to deconditioning but would like to exlcdeu IHD, structural heart abnormlities or VHD.\par \par Will schedule echo and nuclear stress test.

## 2023-04-18 NOTE — HISTORY OF PRESENT ILLNESS
[FreeTextEntry1] : Ms. Ghotra is an 82 year old female that presents [facility] for a cardiovascular evaluation.\par \par She carries a past medical history as outlined below:\par \par -Hx of Hyperlipidemia\par -Hx of Type 2 diabetes\par -Hx of Anxiety and depression\par \par Patient   passed away from an MI in late February. Patient is tearful.\par \par She reports that she recently was evaluated by her PCP, Kd Drummond and was notified that her EKG was "abnormal".\par \par Patient reports today for an evaluation. \par \par Blood pressure today:  128/ 81\par EKG- Sinus rhythm , LVH, left axis, non specific, possible anterior fascicular block\par          low voltage, possible pulmonary disease\par \par \par She rpoerts that since her 's passing , she has had little appetite and has lost weight. She has also not been as active as previoulsy and now when she walks she feels winded. No CP, palpitations , orthopnea or PND. No peripheral edema , fevers or chills.\par \par Seeing a therapist which is helping a great deal.  They have 5 adult children between them  \par

## 2023-04-19 ENCOUNTER — APPOINTMENT (OUTPATIENT)
Dept: ENDOCRINOLOGY | Facility: CLINIC | Age: 83
End: 2023-04-19
Payer: MEDICARE

## 2023-04-19 VITALS
WEIGHT: 151 LBS | BODY MASS INDEX: 25.16 KG/M2 | DIASTOLIC BLOOD PRESSURE: 80 MMHG | OXYGEN SATURATION: 97 % | SYSTOLIC BLOOD PRESSURE: 110 MMHG | HEART RATE: 84 BPM | HEIGHT: 65 IN

## 2023-04-19 PROCEDURE — 99214 OFFICE O/P EST MOD 30 MIN: CPT | Mod: 25

## 2023-04-19 PROCEDURE — 95251 CONT GLUC MNTR ANALYSIS I&R: CPT

## 2023-04-19 NOTE — ASSESSMENT
[FreeTextEntry1] : 82F presents for initial evaluation of DM2, hypothyroidism, osteoporosis.\par \par 1) DM2\par HbA1c 6.1% well controlled, at goal (reviewed that given age A1c could be higher but she is very happy with results of Ozempic and would like to stay on it).\par Continue Ozempic 0.5mg SQ weekly\par continue strategies for carb consistent diet and physical activity. RD eval completed.\par Continue use of Avinash\par notify me if develops hypoglycemia\par BP stable, check alb/creat\par Follow up ophtho, DM foot precautions\par \par 2) Hypothyroidism\par TFT at goal, continue levothyroxine 50 mcg po daily, take in AM on empty stomach\par \par 3) Thyroid nodules\par bilateral nodules, largest 1.3cm, \par LLP nodule benign on FNA, RMP nodule nondiagnostic.\par manage conservatively for now, repeat thyroid US 6 months initially recommended (she wished to wait additional 6 months).\par Revisit next visit as she is currently in mourning for .\par \par 4) Osteoporosis\par managed by rheumatology, Reclast last given 1/22/2020, last DXA 12/2021 osteopenia range: T score -1.5 at hip, normal BMD at spine). Monitor conservatively for now. Dairy in diet, vit D supplement.\par Plan for DXA 12/2023\par \par 5) Hyperlipidemia\par continue rosuvastatin 5mg\par \par follow up 6 months

## 2023-04-19 NOTE — REVIEW OF SYSTEMS
[Negative] : Heme/Lymph [Recent Weight Gain (___ Lbs)] : no recent weight gain [Recent Weight Loss (___ Lbs)] : recent weight loss: [unfilled] lbs

## 2023-04-19 NOTE — HISTORY OF PRESENT ILLNESS
[FreeTextEntry1] : 82F presents to follow up for DM2 management\par No known complications, denies retinopathy, neuropathy, nephropathy.\par Had tried metformin developed nausea and unwell could not tolerate. \par She was started on Avinash and Ozempic 0.5mg once weekly 11/20/2020.\par  recently passed away suddenly February 2023, has had a difficult time, was not eating now gradually improving. Lost 10 lbs.\par Using Avinash, download reviewed, see full report scanned into allscripts:\par avg glu 104\par 100% TIR\par 0% very high\par 0% high\par 0% low\par 0% very low\par 0% hypoglycemia\par She reports no hypoglycemia symptoms \par \par History of long term acquired hypothyroidism on levothyroxine 50 mcg many since young age.\par Patient also was found with thyroid nodules on ultrasound, has not had FNA.\par Saw Dr. Mckee for FNA aug 2021 - LLP nodule benign (category 2). RMP nodule - cat 1 nondiagnostic, recommendationfor follow up US 6 months.\par \par Also has history of osteoporosis, follows long term with a rheumatologist Dr. Chasidy John and receives Reclast infusions last dose received 1/22/2020. Had DXA 12/2021 osteopenia range and improved from prior.

## 2023-04-20 ENCOUNTER — NON-APPOINTMENT (OUTPATIENT)
Age: 83
End: 2023-04-20

## 2023-04-20 LAB
CREAT SPEC-SCNC: 291 MG/DL
MICROALBUMIN 24H UR DL<=1MG/L-MCNC: 8.2 MG/DL
MICROALBUMIN/CREAT 24H UR-RTO: 28 MG/G

## 2023-04-24 ENCOUNTER — APPOINTMENT (OUTPATIENT)
Dept: CV DIAGNOSTICS | Facility: HOSPITAL | Age: 83
End: 2023-04-24

## 2023-04-24 ENCOUNTER — OUTPATIENT (OUTPATIENT)
Dept: OUTPATIENT SERVICES | Facility: HOSPITAL | Age: 83
LOS: 1 days | End: 2023-04-24
Payer: MEDICARE

## 2023-04-24 DIAGNOSIS — Z96.652 PRESENCE OF LEFT ARTIFICIAL KNEE JOINT: Chronic | ICD-10-CM

## 2023-04-24 DIAGNOSIS — E11.9 TYPE 2 DIABETES MELLITUS WITHOUT COMPLICATIONS: ICD-10-CM

## 2023-04-24 PROCEDURE — A9500: CPT

## 2023-04-24 PROCEDURE — 78452 HT MUSCLE IMAGE SPECT MULT: CPT | Mod: 26,MH

## 2023-04-24 PROCEDURE — 93018 CV STRESS TEST I&R ONLY: CPT

## 2023-04-24 PROCEDURE — 93017 CV STRESS TEST TRACING ONLY: CPT

## 2023-04-24 PROCEDURE — 78452 HT MUSCLE IMAGE SPECT MULT: CPT | Mod: MH

## 2023-04-24 PROCEDURE — 93016 CV STRESS TEST SUPVJ ONLY: CPT

## 2023-04-25 ENCOUNTER — NON-APPOINTMENT (OUTPATIENT)
Age: 83
End: 2023-04-25

## 2023-04-25 DIAGNOSIS — R94.39 ABNORMAL RESULT OF OTHER CARDIOVASCULAR FUNCTION STUDY: ICD-10-CM

## 2023-05-09 ENCOUNTER — TRANSCRIPTION ENCOUNTER (OUTPATIENT)
Age: 83
End: 2023-05-09

## 2023-05-17 ENCOUNTER — APPOINTMENT (OUTPATIENT)
Dept: CT IMAGING | Facility: CLINIC | Age: 83
End: 2023-05-17
Payer: MEDICARE

## 2023-05-17 ENCOUNTER — OUTPATIENT (OUTPATIENT)
Dept: OUTPATIENT SERVICES | Facility: HOSPITAL | Age: 83
LOS: 1 days | End: 2023-05-17
Payer: MEDICARE

## 2023-05-17 DIAGNOSIS — R94.39 ABNORMAL RESULT OF OTHER CARDIOVASCULAR FUNCTION STUDY: ICD-10-CM

## 2023-05-17 DIAGNOSIS — Z96.652 PRESENCE OF LEFT ARTIFICIAL KNEE JOINT: Chronic | ICD-10-CM

## 2023-05-17 PROCEDURE — 75574 CT ANGIO HRT W/3D IMAGE: CPT

## 2023-05-17 PROCEDURE — 75574 CT ANGIO HRT W/3D IMAGE: CPT | Mod: 26,MH

## 2023-05-23 ENCOUNTER — NON-APPOINTMENT (OUTPATIENT)
Age: 83
End: 2023-05-23

## 2023-05-23 ENCOUNTER — APPOINTMENT (OUTPATIENT)
Dept: CARDIOLOGY | Facility: CLINIC | Age: 83
End: 2023-05-23

## 2023-05-24 ENCOUNTER — NON-APPOINTMENT (OUTPATIENT)
Age: 83
End: 2023-05-24

## 2023-05-24 ENCOUNTER — APPOINTMENT (OUTPATIENT)
Dept: INTERNAL MEDICINE | Facility: CLINIC | Age: 83
End: 2023-05-24
Payer: MEDICARE

## 2023-05-24 DIAGNOSIS — M19.90 UNSPECIFIED OSTEOARTHRITIS, UNSPECIFIED SITE: ICD-10-CM

## 2023-05-24 PROCEDURE — 93000 ELECTROCARDIOGRAM COMPLETE: CPT

## 2023-05-24 PROCEDURE — 99215 OFFICE O/P EST HI 40 MIN: CPT | Mod: 25

## 2023-05-24 PROCEDURE — 36415 COLL VENOUS BLD VENIPUNCTURE: CPT

## 2023-05-24 PROCEDURE — 82270 OCCULT BLOOD FECES: CPT

## 2023-05-24 NOTE — HISTORY OF PRESENT ILLNESS
[No Pertinent Cardiac History] : no history of aortic stenosis, atrial fibrillation, coronary artery disease, recent myocardial infarction, or implantable device/pacemaker [No Pertinent Pulmonary History] : no history of asthma, COPD, sleep apnea, or smoking [No Adverse Anesthesia Reaction] : no adverse anesthesia reaction in self or family member [Diabetes] : diabetes [(Patient denies any chest pain, claudication, dyspnea on exertion, orthopnea, palpitations or syncope)] : Patient denies any chest pain, claudication, dyspnea on exertion, orthopnea, palpitations or syncope [Chronic Anticoagulation] : no chronic anticoagulation [Chronic Kidney Disease] : no chronic kidney disease [FreeTextEntry1] : Right cataract [FreeTextEntry4] : The patient will have right cataract.  She has no significant cardiac history at this point and works out.  Coronary calcium score is 0 she is treated for depression with sertraline and bupropion and doing quite well.  She is treated for diabetes with Ozempic and has lost weight and sugars well controlled by hemoglobin A1c [FreeTextEntry7] : EKG unchanged in sinus rhythm with a left anterior hemiblock and 2 OR in the V1 and V2 leads

## 2023-05-24 NOTE — PHYSICAL EXAM
[Normal Female:] : bladder was normal on palpation [Normal] : no CVA tenderness, no spinal tenderness [Comprehensive Foot Exam Normal] : Right and left foot were examined and both feet are normal. No ulcers in either foot. Toes are normal and with full ROM.  Normal tactile sensation with monofilament testing throughout both feet [de-identified] : The patient has lost weight on the Ozempic and her blood pressure was 120/70 [de-identified] : No significantly [FreeTextEntry1] : Back negative no masses [de-identified] : No adenopathy [de-identified] : Thyroid is not palpable [de-identified] : Osteoarthritis of the hands no significant lesions [de-identified] : Nonfocal

## 2023-05-24 NOTE — ASSESSMENT
[FreeTextEntry4] : History and physical was sent to [facility] with clearance EKG and CBC and chemistry are done the patient is advised that she is not to take NSAID S, aspirin, over-the-counter medications and vitamins for 1 week before the surgery.  She can take her Ozempic on Tuesday after the surgery.  She is to hold Xanax the day of surgery.  She can take all other medications

## 2023-05-25 LAB
ALBUMIN SERPL ELPH-MCNC: 4.3 G/DL
ALP BLD-CCNC: 87 U/L
ALT SERPL-CCNC: 15 U/L
ANION GAP SERPL CALC-SCNC: 12 MMOL/L
AST SERPL-CCNC: 13 U/L
BILIRUB SERPL-MCNC: 0.2 MG/DL
BUN SERPL-MCNC: 17 MG/DL
CALCIUM SERPL-MCNC: 10.2 MG/DL
CHLORIDE SERPL-SCNC: 104 MMOL/L
CO2 SERPL-SCNC: 25 MMOL/L
CREAT SERPL-MCNC: 0.9 MG/DL
EGFR: 64 ML/MIN/1.73M2
FRUCTOSAMINE SERPL-MCNC: 237 UMOL/L
GLUCOSE SERPL-MCNC: 140 MG/DL
POTASSIUM SERPL-SCNC: 5.2 MMOL/L
PROT SERPL-MCNC: 6.7 G/DL
SODIUM SERPL-SCNC: 141 MMOL/L

## 2023-06-15 ENCOUNTER — APPOINTMENT (OUTPATIENT)
Dept: CV DIAGNOSITCS | Facility: HOSPITAL | Age: 83
End: 2023-06-15

## 2023-06-15 ENCOUNTER — OUTPATIENT (OUTPATIENT)
Dept: OUTPATIENT SERVICES | Facility: HOSPITAL | Age: 83
LOS: 1 days | End: 2023-06-15
Payer: MEDICARE

## 2023-06-15 DIAGNOSIS — E78.00 PURE HYPERCHOLESTEROLEMIA, UNSPECIFIED: ICD-10-CM

## 2023-06-15 DIAGNOSIS — Z96.652 PRESENCE OF LEFT ARTIFICIAL KNEE JOINT: Chronic | ICD-10-CM

## 2023-06-15 PROCEDURE — 93356 MYOCRD STRAIN IMG SPCKL TRCK: CPT

## 2023-06-15 PROCEDURE — 76376 3D RENDER W/INTRP POSTPROCES: CPT

## 2023-06-15 PROCEDURE — 76376 3D RENDER W/INTRP POSTPROCES: CPT | Mod: 26

## 2023-06-15 PROCEDURE — 93306 TTE W/DOPPLER COMPLETE: CPT

## 2023-06-15 PROCEDURE — 93306 TTE W/DOPPLER COMPLETE: CPT | Mod: 26

## 2023-06-27 ENCOUNTER — APPOINTMENT (OUTPATIENT)
Dept: INTERNAL MEDICINE | Facility: CLINIC | Age: 83
End: 2023-06-27
Payer: MEDICARE

## 2023-06-27 VITALS
WEIGHT: 151 LBS | TEMPERATURE: 97.8 F | HEIGHT: 65 IN | BODY MASS INDEX: 25.16 KG/M2 | DIASTOLIC BLOOD PRESSURE: 80 MMHG | HEART RATE: 89 BPM | OXYGEN SATURATION: 97 % | SYSTOLIC BLOOD PRESSURE: 140 MMHG

## 2023-06-27 DIAGNOSIS — H26.9 UNSPECIFIED CATARACT: ICD-10-CM

## 2023-06-27 DIAGNOSIS — Z01.818 ENCOUNTER FOR OTHER PREPROCEDURAL EXAMINATION: ICD-10-CM

## 2023-06-27 PROCEDURE — 99214 OFFICE O/P EST MOD 30 MIN: CPT

## 2023-08-27 ENCOUNTER — NON-APPOINTMENT (OUTPATIENT)
Age: 83
End: 2023-08-27

## 2023-08-29 ENCOUNTER — APPOINTMENT (OUTPATIENT)
Dept: GERIATRICS | Facility: CLINIC | Age: 83
End: 2023-08-29
Payer: MEDICARE

## 2023-08-29 ENCOUNTER — NON-APPOINTMENT (OUTPATIENT)
Age: 83
End: 2023-08-29

## 2023-08-29 VITALS
HEIGHT: 65 IN | TEMPERATURE: 97.5 F | HEART RATE: 71 BPM | DIASTOLIC BLOOD PRESSURE: 91 MMHG | SYSTOLIC BLOOD PRESSURE: 149 MMHG | OXYGEN SATURATION: 97 % | RESPIRATION RATE: 16 BRPM

## 2023-08-29 DIAGNOSIS — K59.00 CONSTIPATION, UNSPECIFIED: ICD-10-CM

## 2023-08-29 PROCEDURE — 99215 OFFICE O/P EST HI 40 MIN: CPT

## 2023-08-29 RX ORDER — VALACYCLOVIR 500 MG/1
500 TABLET, FILM COATED ORAL DAILY
Qty: 90 | Refills: 1 | Status: COMPLETED | COMMUNITY
Start: 2021-03-18 | End: 2023-08-29

## 2023-08-29 RX ORDER — ESTRADIOL 10 UG/1
10 TABLET VAGINAL
Qty: 24 | Refills: 1 | Status: COMPLETED | COMMUNITY
Start: 2021-11-26 | End: 2023-08-29

## 2023-08-29 RX ORDER — BROMPHENIRAMINE MALEATE, PSEUDOEPHEDRINE HYDROCHLORIDE, 2; 30; 10 MG/5ML; MG/5ML; MG/5ML
30-2-10 SYRUP ORAL
Qty: 280 | Refills: 0 | Status: COMPLETED | COMMUNITY
Start: 2022-10-23 | End: 2023-08-29

## 2023-08-29 RX ORDER — POLYETHYLENE GLYCOL 3350 17 G
17 POWDER IN PACKET (EA) ORAL
Refills: 0 | Status: ACTIVE | COMMUNITY
Start: 2023-08-29

## 2023-08-29 RX ORDER — PROMETHAZINE HYDROCHLORIDE AND DEXTROMETHORPHAN HYDROBROMIDE ORAL SOLUTION 15; 6.25 MG/5ML; MG/5ML
6.25-15 SOLUTION ORAL
Qty: 140 | Refills: 0 | Status: COMPLETED | COMMUNITY
Start: 2022-10-15 | End: 2023-08-29

## 2023-08-29 RX ORDER — FLUTICASONE PROPIONATE 50 UG/1
50 SPRAY, METERED NASAL TWICE DAILY
Qty: 1 | Refills: 0 | Status: COMPLETED | COMMUNITY
Start: 2022-10-14 | End: 2023-08-29

## 2023-08-29 NOTE — PHYSICAL EXAM
[Alert] : alert [No Acute Distress] : in no acute distress [Normal Outer Ear/Nose] : the ears and nose were normal in appearance [Normal Appearance] : the appearance of the neck was normal [Supple] : the neck was supple [No Respiratory Distress] : no respiratory distress [No Acc Muscle Use] : no accessory muscle use [Respiration, Rhythm And Depth] : normal respiratory rhythm and effort [Auscultation Breath Sounds / Voice Sounds] : lungs were clear to auscultation bilaterally [Heart Rate And Rhythm] : heart rate was normal and rhythm regular [Abdomen Tenderness] : non-tender [Bowel Sounds] : normal bowel sounds [Abdomen Soft] : soft [No Spinal Tenderness] : no spinal tenderness [Normal Color / Pigmentation] : normal skin color and pigmentation [Normal Turgor] : normal skin turgor [No Focal Deficits] : no focal deficits [Normal Affect] : the affect was normal [Normal Mood] : the mood was normal

## 2023-08-29 NOTE — ASSESSMENT
[FreeTextEntry1] : 1) Vaginal bleeding - stanford Harding I will inquire on followup needs. Pt is no longer bleeding. Pt on vaginal estrogen currently.  2) Thyroid nodule - bxd recommended to have thyroid US. Needs follow up US? Will ask Dr. Wilson re followup.  3) Colonsocpy due - Dr EMMETT lanza and Иван referral offered.  4) Osteoporosis - reclast stopped. May need followup or dexa  5) Diabetes - check hgba1c  6) Palpitations - EKG done,NSR. Stress done by Dr. Trivedi.  7) Weight loss - 35 # on Ozempic for diabetes, Pt will not let me weigh her. Will encourage in future.  8) Anxiety and depression - continue wellbutrin and sertraline. Pt using xanax, advised caution.  9) Meds: Sleepy during day and insomnia at night - Pt taking modafinil (need to confirm) in day to stimulate and taking ambien at night to help sleep. I think that should be reevaluated perhaps try to decrease modafinil to start. will call pharmacy and clarify med list.  10) Immunizations up to date  Return in 3 months.

## 2023-08-29 NOTE — HISTORY OF PRESENT ILLNESS
[No falls in past year] : Patient reported no falls in the past year [1] : 2) Feeling down, depressed, or hopeless for several days (1) [PHQ-2 Positive] : PHQ-2 Positive [I have developed a follow-up plan documented below in the note.] : I have developed a follow-up plan documented below in the note. [Designated Healthcare Proxy] : Designated healthcare proxy [Name: ___] : Health Care Proxy's Name: [unfilled]  [Relationship: ___] : Relationship: [unfilled] [Completely Independent] : Completely independent. [FreeTextEntry1] : 84 yo female   in 2023. Pt's life revolved around  who was president of [employer] for 30 years.  She has had a difficult time after his death.  [facility] and continues. He is helping manage her anxiety.  She was  36 years. Former [employer] - [employer].   Her biggest concern is having a PCP. Her PCP retired, Dr. Perdue. SHe is here for complete evaluation and to establish care.  Children: William - son, [facility] but moved to [place] recently Rachael - dtr, [facility] Katelynn - dtr and HCP, [facility]  Her  has children - 2 sons. Good relationships. 14 grandchildren in total  Has  two days a week  Hearing [employer]  She suffers with anxiety and depression but she is functioning and is appreciative of life and family and friends. She wants to be resilient for her children to see how to function through sadness and loss.  She has no physical complaints.  [[id number]] : 2 Satisfactory

## 2023-08-31 LAB
25(OH)D3 SERPL-MCNC: 32.4 NG/ML
ALBUMIN SERPL ELPH-MCNC: 4.8 G/DL
ALP BLD-CCNC: 87 U/L
ALT SERPL-CCNC: 14 U/L
ANION GAP SERPL CALC-SCNC: 13 MMOL/L
AST SERPL-CCNC: 16 U/L
BASOPHILS # BLD AUTO: 0.08 K/UL
BASOPHILS NFR BLD AUTO: 0.7 %
BILIRUB SERPL-MCNC: 0.3 MG/DL
BUN SERPL-MCNC: 21 MG/DL
CALCIUM SERPL-MCNC: 10.1 MG/DL
CHLORIDE SERPL-SCNC: 103 MMOL/L
CHOLEST SERPL-MCNC: 177 MG/DL
CO2 SERPL-SCNC: 24 MMOL/L
CREAT SERPL-MCNC: 1 MG/DL
EGFR: 56 ML/MIN/1.73M2
EOSINOPHIL # BLD AUTO: 0.43 K/UL
EOSINOPHIL NFR BLD AUTO: 3.8 %
ESTIMATED AVERAGE GLUCOSE: 126 MG/DL
FERRITIN SERPL-MCNC: 204 NG/ML
FOLATE SERPL-MCNC: 17.5 NG/ML
GLUCOSE SERPL-MCNC: 98 MG/DL
HBA1C MFR BLD HPLC: 6 %
HCT VFR BLD CALC: 43.1 %
HDLC SERPL-MCNC: 69 MG/DL
HGB BLD-MCNC: 13.7 G/DL
IMM GRANULOCYTES NFR BLD AUTO: 0.4 %
IRON SATN MFR SERPL: 33 %
IRON SERPL-MCNC: 95 UG/DL
LDLC SERPL CALC-MCNC: 85 MG/DL
LYMPHOCYTES # BLD AUTO: 2.47 K/UL
LYMPHOCYTES NFR BLD AUTO: 21.6 %
MAN DIFF?: NORMAL
MCHC RBC-ENTMCNC: 31.1 PG
MCHC RBC-ENTMCNC: 31.8 GM/DL
MCV RBC AUTO: 98 FL
MONOCYTES # BLD AUTO: 0.81 K/UL
MONOCYTES NFR BLD AUTO: 7.1 %
NEUTROPHILS # BLD AUTO: 7.61 K/UL
NEUTROPHILS NFR BLD AUTO: 66.4 %
NONHDLC SERPL-MCNC: 107 MG/DL
PLATELET # BLD AUTO: 383 K/UL
POTASSIUM SERPL-SCNC: 5 MMOL/L
PROT SERPL-MCNC: 7.1 G/DL
RBC # BLD: 4.4 M/UL
RBC # FLD: 14.6 %
SODIUM SERPL-SCNC: 140 MMOL/L
TIBC SERPL-MCNC: 289 UG/DL
TRIGL SERPL-MCNC: 127 MG/DL
TSH SERPL-ACNC: 1.82 UIU/ML
UIBC SERPL-MCNC: 194 UG/DL
VIT B12 SERPL-MCNC: 470 PG/ML
WBC # FLD AUTO: 11.45 K/UL

## 2023-09-07 ENCOUNTER — APPOINTMENT (OUTPATIENT)
Dept: OTOLARYNGOLOGY | Facility: CLINIC | Age: 83
End: 2023-09-07
Payer: MEDICARE

## 2023-09-07 VITALS
OXYGEN SATURATION: 96 % | BODY MASS INDEX: 24.49 KG/M2 | HEART RATE: 70 BPM | HEIGHT: 65 IN | SYSTOLIC BLOOD PRESSURE: 127 MMHG | WEIGHT: 147 LBS | DIASTOLIC BLOOD PRESSURE: 80 MMHG

## 2023-09-07 DIAGNOSIS — J38.7 OTHER DISEASES OF LARYNX: ICD-10-CM

## 2023-09-07 PROCEDURE — 31575 DIAGNOSTIC LARYNGOSCOPY: CPT

## 2023-09-07 PROCEDURE — 99214 OFFICE O/P EST MOD 30 MIN: CPT | Mod: 25

## 2023-09-07 NOTE — CONSULT LETTER
[Dear  ___] : Dear  [unfilled], [Courtesy Letter:] : I had the pleasure of seeing your patient, [unfilled], in my office today. [Please see my note below.] : Please see my note below. [Consult Closing:] : Thank you very much for allowing me to participate in the care of this patient.  If you have any questions, please do not hesitate to contact me. [Sincerely,] : Sincerely, [FreeTextEntry2] : Dr. Chaz Gutiérrez  [FreeTextEntry3] : \par  Tremayne Cleveland MD, FACS\par  \par  Otolaryngology-Head and Neck Surgery\par  [employer] at [facility]\par

## 2023-09-07 NOTE — HISTORY OF PRESENT ILLNESS
[de-identified] : 83 year old woman pt was referred by ENT Dr. Chaz Gutiérrez presents with follow up for L ventricle cyst. s/p resection of L laryngeal lesion on 2/13/23, path showed laryngeal cyst.   States she is doing well since last clinic visit.  Patient reports she has an occasional feeling of a thick tongue and clear phlegm.   Patient denies throat pain, globus sensation, dysphagia, odynophagia, dyspnea, dysphonia, hemoptysis or otalgia. Denies recent fevers/infections, chills, night sweats, weight loss.

## 2023-09-11 ENCOUNTER — TRANSCRIPTION ENCOUNTER (OUTPATIENT)
Age: 83
End: 2023-09-11

## 2023-09-12 ENCOUNTER — NON-APPOINTMENT (OUTPATIENT)
Age: 83
End: 2023-09-12

## 2023-10-02 ENCOUNTER — APPOINTMENT (OUTPATIENT)
Dept: ULTRASOUND IMAGING | Facility: HOSPITAL | Age: 83
End: 2023-10-02
Payer: MEDICARE

## 2023-10-02 ENCOUNTER — OUTPATIENT (OUTPATIENT)
Dept: OUTPATIENT SERVICES | Facility: HOSPITAL | Age: 83
LOS: 1 days | End: 2023-10-02
Payer: MEDICARE

## 2023-10-02 DIAGNOSIS — E04.1 NONTOXIC SINGLE THYROID NODULE: ICD-10-CM

## 2023-10-02 DIAGNOSIS — Z96.652 PRESENCE OF LEFT ARTIFICIAL KNEE JOINT: Chronic | ICD-10-CM

## 2023-10-02 PROCEDURE — 76536 US EXAM OF HEAD AND NECK: CPT | Mod: 26

## 2023-10-02 PROCEDURE — 76536 US EXAM OF HEAD AND NECK: CPT

## 2023-10-10 ENCOUNTER — APPOINTMENT (OUTPATIENT)
Dept: ORTHOPEDIC SURGERY | Facility: CLINIC | Age: 83
End: 2023-10-10
Payer: MEDICARE

## 2023-10-10 VITALS
TEMPERATURE: 97.5 F | WEIGHT: 147 LBS | DIASTOLIC BLOOD PRESSURE: 72 MMHG | BODY MASS INDEX: 24.49 KG/M2 | HEART RATE: 87 BPM | SYSTOLIC BLOOD PRESSURE: 111 MMHG | HEIGHT: 65 IN

## 2023-10-10 DIAGNOSIS — M54.50 LOW BACK PAIN, UNSPECIFIED: ICD-10-CM

## 2023-10-10 DIAGNOSIS — M70.50 OTHER BURSITIS OF KNEE, UNSPECIFIED KNEE: ICD-10-CM

## 2023-10-10 DIAGNOSIS — Z96.652 PRESENCE OF LEFT ARTIFICIAL KNEE JOINT: ICD-10-CM

## 2023-10-10 PROCEDURE — 73562 X-RAY EXAM OF KNEE 3: CPT | Mod: LT

## 2023-10-10 PROCEDURE — 73560 X-RAY EXAM OF KNEE 1 OR 2: CPT | Mod: RT

## 2023-10-10 PROCEDURE — 99214 OFFICE O/P EST MOD 30 MIN: CPT

## 2023-10-12 ENCOUNTER — APPOINTMENT (OUTPATIENT)
Dept: GERIATRICS | Facility: CLINIC | Age: 83
End: 2023-10-12
Payer: MEDICARE

## 2023-10-12 VITALS
HEIGHT: 65 IN | OXYGEN SATURATION: 96 % | RESPIRATION RATE: 16 BRPM | DIASTOLIC BLOOD PRESSURE: 80 MMHG | WEIGHT: 147 LBS | HEART RATE: 77 BPM | BODY MASS INDEX: 24.49 KG/M2 | SYSTOLIC BLOOD PRESSURE: 147 MMHG

## 2023-10-12 DIAGNOSIS — Z23 ENCOUNTER FOR IMMUNIZATION: ICD-10-CM

## 2023-10-12 PROCEDURE — 90662 IIV NO PRSV INCREASED AG IM: CPT

## 2023-10-12 PROCEDURE — G0008: CPT

## 2023-10-17 NOTE — ASU PATIENT PROFILE, ADULT - PRESSURE ULCER(S)
Detail Level: Generalized
General Sunscreen Counseling: I recommended a broad spectrum sunscreen with a SPF of 30 or higher.  Sunscreens should be applied at least 15 minutes prior to expected sun exposure and then every 2 hours after that as long as sun exposure continues.  Sun protective clothing was also encouraged.
no

## 2023-10-18 ENCOUNTER — APPOINTMENT (OUTPATIENT)
Dept: ENDOCRINOLOGY | Facility: CLINIC | Age: 83
End: 2023-10-18
Payer: MEDICARE

## 2023-10-18 ENCOUNTER — NON-APPOINTMENT (OUTPATIENT)
Age: 83
End: 2023-10-18

## 2023-10-18 VITALS
DIASTOLIC BLOOD PRESSURE: 64 MMHG | OXYGEN SATURATION: 98 % | SYSTOLIC BLOOD PRESSURE: 126 MMHG | HEIGHT: 63.78 IN | WEIGHT: 147 LBS | BODY MASS INDEX: 25.41 KG/M2 | HEART RATE: 75 BPM

## 2023-10-18 DIAGNOSIS — M81.0 AGE-RELATED OSTEOPOROSIS W/OUT CURRENT PATHOLOGICAL FRACTURE: ICD-10-CM

## 2023-10-18 PROCEDURE — 95251 CONT GLUC MNTR ANALYSIS I&R: CPT

## 2023-10-18 PROCEDURE — 99214 OFFICE O/P EST MOD 30 MIN: CPT | Mod: 25

## 2023-11-01 ENCOUNTER — TRANSCRIPTION ENCOUNTER (OUTPATIENT)
Age: 83
End: 2023-11-01

## 2023-11-02 ENCOUNTER — TRANSCRIPTION ENCOUNTER (OUTPATIENT)
Age: 83
End: 2023-11-02

## 2023-11-17 ENCOUNTER — RX RENEWAL (OUTPATIENT)
Age: 83
End: 2023-11-17

## 2023-11-24 ENCOUNTER — APPOINTMENT (OUTPATIENT)
Dept: MAMMOGRAPHY | Facility: HOSPITAL | Age: 83
End: 2023-11-24
Payer: MEDICARE

## 2023-11-24 ENCOUNTER — RESULT REVIEW (OUTPATIENT)
Age: 83
End: 2023-11-24

## 2023-11-24 ENCOUNTER — OUTPATIENT (OUTPATIENT)
Dept: OUTPATIENT SERVICES | Facility: HOSPITAL | Age: 83
LOS: 1 days | End: 2023-11-24
Payer: MEDICARE

## 2023-11-24 DIAGNOSIS — Z96.652 PRESENCE OF LEFT ARTIFICIAL KNEE JOINT: Chronic | ICD-10-CM

## 2023-11-24 DIAGNOSIS — Z12.39 ENCOUNTER FOR OTHER SCREENING FOR MALIGNANT NEOPLASM OF BREAST: ICD-10-CM

## 2023-11-24 PROCEDURE — 77067 SCR MAMMO BI INCL CAD: CPT | Mod: 26

## 2023-11-24 PROCEDURE — 77067 SCR MAMMO BI INCL CAD: CPT

## 2023-11-24 PROCEDURE — 77063 BREAST TOMOSYNTHESIS BI: CPT | Mod: 26

## 2023-11-24 PROCEDURE — 77063 BREAST TOMOSYNTHESIS BI: CPT

## 2023-12-08 ENCOUNTER — APPOINTMENT (OUTPATIENT)
Dept: RADIOLOGY | Facility: HOSPITAL | Age: 83
End: 2023-12-08
Payer: MEDICARE

## 2023-12-08 ENCOUNTER — OUTPATIENT (OUTPATIENT)
Dept: OUTPATIENT SERVICES | Facility: HOSPITAL | Age: 83
LOS: 1 days | End: 2023-12-08
Payer: MEDICARE

## 2023-12-08 DIAGNOSIS — Z96.652 PRESENCE OF LEFT ARTIFICIAL KNEE JOINT: Chronic | ICD-10-CM

## 2023-12-08 DIAGNOSIS — M85.80 OTHER SPECIFIED DISORDERS OF BONE DENSITY AND STRUCTURE, UNSPECIFIED SITE: ICD-10-CM

## 2023-12-08 PROCEDURE — 77080 DXA BONE DENSITY AXIAL: CPT

## 2023-12-08 PROCEDURE — 77080 DXA BONE DENSITY AXIAL: CPT | Mod: 26

## 2024-01-29 ENCOUNTER — RX RENEWAL (OUTPATIENT)
Age: 84
End: 2024-01-29

## 2024-02-02 ENCOUNTER — NON-APPOINTMENT (OUTPATIENT)
Age: 84
End: 2024-02-02

## 2024-02-14 ENCOUNTER — APPOINTMENT (OUTPATIENT)
Dept: ENDOCRINOLOGY | Facility: CLINIC | Age: 84
End: 2024-02-14
Payer: MEDICARE

## 2024-02-14 VITALS
HEIGHT: 63.78 IN | SYSTOLIC BLOOD PRESSURE: 110 MMHG | WEIGHT: 153 LBS | HEART RATE: 85 BPM | BODY MASS INDEX: 26.44 KG/M2 | OXYGEN SATURATION: 98 % | DIASTOLIC BLOOD PRESSURE: 68 MMHG

## 2024-02-14 PROCEDURE — 95251 CONT GLUC MNTR ANALYSIS I&R: CPT

## 2024-02-14 PROCEDURE — 99214 OFFICE O/P EST MOD 30 MIN: CPT

## 2024-02-14 RX ORDER — FLASH GLUCOSE SENSOR
KIT MISCELLANEOUS
Qty: 6 | Refills: 3 | Status: ACTIVE | COMMUNITY
Start: 2020-10-30 | End: 1900-01-01

## 2024-02-14 RX ORDER — ROSUVASTATIN CALCIUM 5 MG/1
5 TABLET, FILM COATED ORAL
Qty: 90 | Refills: 3 | Status: ACTIVE | COMMUNITY
Start: 2019-09-16 | End: 1900-01-01

## 2024-02-14 RX ORDER — SEMAGLUTIDE 0.68 MG/ML
2 INJECTION, SOLUTION SUBCUTANEOUS
Qty: 3 | Refills: 3 | Status: ACTIVE | COMMUNITY
Start: 2020-12-14 | End: 1900-01-01

## 2024-02-14 NOTE — HISTORY OF PRESENT ILLNESS
[FreeTextEntry1] : 83F presents to follow up for DM2 management No known complications, denies retinopathy, neuropathy, nephropathy. Had tried metformin developed nausea and unwell could not tolerate.  She was started on Avinash and Ozempic 0.5mg once weekly 11/20/2020.  passed away suddenly February 2023, has had a difficult time but is now adjusting better with time.  She had episode of GI symptoms constipation then diarrhea and abdominal pain that lasted a few days and then resolved. Usually tolerates Ozempic well.  Using Avinash, download reviewed, see full report scanned into allscripts: avg glu 105 97% TIR 0% very high 0% high 2% low 1% very low Denies hypoglycemia symptoms.  History of long term acquired hypothyroidism on levothyroxine 50 mcg many since young age. Patient also was found with thyroid nodules on ultrasound, has not had FNA. Saw Dr. Mckee for FNA aug 2021 - LLP nodule benign (category 2). RMP nodule - cat 1 nondiagnostic. US 10/2023 - stable thyroid nodules: right midpole 1.2 x 0.5 x 0.8 cm hypoechoic, lower pole 0.8 x 0.7 x 0.7 hypoechoic. Left lower 1.0x 0.9 x0.9 hypoechoic.  Also has history of osteoporosis, follows long term with a rheumatologist Dr. Chasidy John and receives Reclast infusions last dose received 1/22/2020. Had DXA 12/2021 osteopenia range and improved from prior.  Completed DXA 12/2023 - in osteopenia range at hip (normal at spine). Spine T score 1.3, fem neck -1.6, total hip -2.1.

## 2024-02-14 NOTE — ASSESSMENT
[FreeTextEntry1] : 83F presents for initial evaluation of DM2, hypothyroidism, osteoporosis.  1) DM2 - managed with Ozempic prior intolerance to metformin Check HbA1c with labs today (POCT unavailable) Has been well controlled, at goal (reviewed that given age A1c could be higher but she is very happy with results of Ozempic and would like to stay on it). Continue Ozempic 0.5mg SQ weekly Discussed that if she does have ongoing issue with GI symptoms that it would be ok to stop Ozempic for a few weeks to see if symptoms improve. Another option would be lowering to 0.25. She prefers to stay with 0.5mg at this time. continue strategies for carb consistent diet and physical activity.  Continue use of Avinash notify me if develops hypoglycemia - verify with fingerstick if see low glucose on Avinash. BP stable,  alb/creat 28 upper normal limit as of April 2023 Follow up ophtho, DM foot precautions  2) Hypothyroidism TFT at goal previously, continue levothyroxine 50 mcg po daily, take in AM on empty stomach check TFTs  3) Thyroid nodules bilateral nodules, largest 1.3cm, LLP nodule benign on FNA, RMP nodule nondiagnostic. manage conservatively for now, repeat thyroid US 6 months initially recommended (she wished to wait additional 6 months). US 10/2023 - stable thyroid nodules: right midpole 1.2 x 0.5 x 0.8 cm hypoechoic, lower pole 0.8 x 0.7 x 0.7 hypoechoic. Left lower 1.0x 0.9 x0.9 hypoechoic can repeat in about 1 year  4) Osteoporosis managed by rheumatology, Reclast last given 1/22/2020, last DXA 12/2021 osteopenia range: T score -1.5 at hip, normal BMD at spine). Monitor conservatively for now. Dairy in diet, vit D supplement. Completed DXA 12/2023 - in osteopenia range at hip (normal at spine). Spine T score 1.3, fem neck -1.6, total hip -2.1. Next DXA in 1-2 years  5) Hyperlipidemia continue rosuvastatin 5mg  follow up 6 months.

## 2024-02-14 NOTE — REVIEW OF SYSTEMS
[Recent Weight Gain (___ Lbs)] : no recent weight gain [Recent Weight Loss (___ Lbs)] : recent weight loss: [unfilled] lbs [Negative] : Heme/Lymph

## 2024-02-14 NOTE — PHYSICAL EXAM
[Alert] : alert [Well Nourished] : well nourished [No Acute Distress] : no acute distress [Well Developed] : well developed [Normal Sclera/Conjunctiva] : normal sclera/conjunctiva [EOMI] : extra ocular movement intact [No Proptosis] : no proptosis [Normal Oropharynx] : the oropharynx was normal [Thyroid Not Enlarged] : the thyroid was not enlarged [No Thyroid Nodules] : no palpable thyroid nodules [No Respiratory Distress] : no respiratory distress [No Accessory Muscle Use] : no accessory muscle use [Clear to Auscultation] : lungs were clear to auscultation bilaterally [Normal S1, S2] : normal S1 and S2 [Normal Rate] : heart rate was normal [Regular Rhythm] : with a regular rhythm [No Edema] : no peripheral edema [Pedal Pulses Normal] : the pedal pulses are present [Soft] : abdomen soft [Normal Anterior Cervical Nodes] : no anterior cervical lymphadenopathy [No Spinal Tenderness] : no spinal tenderness [Spine Straight] : spine straight [No Stigmata of Cushings Syndrome] : no stigmata of Cushings Syndrome [Normal Gait] : normal gait [No Involuntary Movements] : no involuntary movements were seen [Normal Strength/Tone] : muscle strength and tone were normal [No Rash] : no rash [Acanthosis Nigricans] : no acanthosis nigricans [Right foot was examined, including] : right foot ~C was examined, including visual inspection with sensory and pulse exams [Left foot was examined, including] : left foot ~C was examined, including visual inspection with sensory and pulse exams [Delayed in the Right Toes] : normal in the toes [Delayed in the Left Toes] : normal in the toes [No Tremors] : no tremors [Oriented x3] : oriented to person, place, and time [Normal Affect] : the affect was normal [Normal Mood] : the mood was normal

## 2024-02-15 LAB
25(OH)D3 SERPL-MCNC: 38.7 NG/ML
ALBUMIN SERPL ELPH-MCNC: 4.5 G/DL
ALP BLD-CCNC: 89 U/L
ALT SERPL-CCNC: 13 U/L
ANION GAP SERPL CALC-SCNC: 11 MMOL/L
AST SERPL-CCNC: 16 U/L
BASOPHILS # BLD AUTO: 0.06 K/UL
BASOPHILS NFR BLD AUTO: 0.6 %
BILIRUB SERPL-MCNC: 0.2 MG/DL
BUN SERPL-MCNC: 20 MG/DL
CALCIUM SERPL-MCNC: 10.2 MG/DL
CALCIUM SERPL-MCNC: 10.2 MG/DL
CHLORIDE SERPL-SCNC: 103 MMOL/L
CHOLEST SERPL-MCNC: 162 MG/DL
CO2 SERPL-SCNC: 26 MMOL/L
CREAT SERPL-MCNC: 1.06 MG/DL
CREAT SPEC-SCNC: 140 MG/DL
EGFR: 52 ML/MIN/1.73M2
EOSINOPHIL # BLD AUTO: 0.51 K/UL
EOSINOPHIL NFR BLD AUTO: 5.3 %
ESTIMATED AVERAGE GLUCOSE: 123 MG/DL
GLUCOSE SERPL-MCNC: 76 MG/DL
HBA1C MFR BLD HPLC: 5.9 %
HCT VFR BLD CALC: 43.1 %
HDLC SERPL-MCNC: 51 MG/DL
HGB BLD-MCNC: 13.5 G/DL
IMM GRANULOCYTES NFR BLD AUTO: 0.3 %
LDLC SERPL CALC-MCNC: 71 MG/DL
LYMPHOCYTES # BLD AUTO: 2.84 K/UL
LYMPHOCYTES NFR BLD AUTO: 29.4 %
MAGNESIUM SERPL-MCNC: 2.3 MG/DL
MAN DIFF?: NORMAL
MCHC RBC-ENTMCNC: 30.1 PG
MCHC RBC-ENTMCNC: 31.3 GM/DL
MCV RBC AUTO: 96 FL
MICROALBUMIN 24H UR DL<=1MG/L-MCNC: 1.4 MG/DL
MICROALBUMIN/CREAT 24H UR-RTO: 10 MG/G
MONOCYTES # BLD AUTO: 0.91 K/UL
MONOCYTES NFR BLD AUTO: 9.4 %
NEUTROPHILS # BLD AUTO: 5.3 K/UL
NEUTROPHILS NFR BLD AUTO: 55 %
NONHDLC SERPL-MCNC: 111 MG/DL
PARATHYROID HORMONE INTACT: 28 PG/ML
PHOSPHATE SERPL-MCNC: 4 MG/DL
PLATELET # BLD AUTO: 388 K/UL
POTASSIUM SERPL-SCNC: 5.1 MMOL/L
PROT SERPL-MCNC: 6.8 G/DL
RBC # BLD: 4.49 M/UL
RBC # FLD: 14.4 %
SODIUM SERPL-SCNC: 140 MMOL/L
T4 FREE SERPL-MCNC: 1 NG/DL
TRIGL SERPL-MCNC: 246 MG/DL
TSH SERPL-ACNC: 2.02 UIU/ML
WBC # FLD AUTO: 9.65 K/UL

## 2024-03-07 ENCOUNTER — APPOINTMENT (OUTPATIENT)
Dept: GERIATRICS | Facility: CLINIC | Age: 84
End: 2024-03-07
Payer: MEDICARE

## 2024-03-07 VITALS
HEIGHT: 65 IN | WEIGHT: 155.5 LBS | BODY MASS INDEX: 25.91 KG/M2 | HEART RATE: 81 BPM | TEMPERATURE: 97.1 F | DIASTOLIC BLOOD PRESSURE: 74 MMHG | SYSTOLIC BLOOD PRESSURE: 121 MMHG | OXYGEN SATURATION: 97 %

## 2024-03-07 DIAGNOSIS — R26.81 UNSTEADINESS ON FEET: ICD-10-CM

## 2024-03-07 DIAGNOSIS — E78.00 PURE HYPERCHOLESTEROLEMIA, UNSPECIFIED: ICD-10-CM

## 2024-03-07 DIAGNOSIS — F32.A DEPRESSION, UNSPECIFIED: ICD-10-CM

## 2024-03-07 PROCEDURE — 99214 OFFICE O/P EST MOD 30 MIN: CPT

## 2024-03-07 PROCEDURE — G2211 COMPLEX E/M VISIT ADD ON: CPT

## 2024-03-07 RX ORDER — SERTRALINE HYDROCHLORIDE 50 MG/1
50 TABLET, FILM COATED ORAL
Refills: 0 | Status: COMPLETED | COMMUNITY
Start: 2023-04-17 | End: 2024-03-07

## 2024-03-07 RX ORDER — SEMAGLUTIDE 0.68 MG/ML
2 INJECTION, SOLUTION SUBCUTANEOUS
Refills: 0 | Status: COMPLETED | COMMUNITY
Start: 2023-04-17 | End: 2024-03-07

## 2024-03-11 NOTE — PHYSICAL EXAM
[Alert] : alert [Normal Outer Ear/Nose] : the ears and nose were normal in appearance [Normal Appearance] : the appearance of the neck was normal [Supple] : the neck was supple [No Acc Muscle Use] : no accessory muscle use [No Respiratory Distress] : no respiratory distress [Auscultation Breath Sounds / Voice Sounds] : lungs were clear to auscultation bilaterally [Respiration, Rhythm And Depth] : normal respiratory rhythm and effort [Heart Rate And Rhythm] : heart rate was normal and rhythm regular [Bowel Sounds] : normal bowel sounds [Abdomen Tenderness] : non-tender [No Spinal Tenderness] : no spinal tenderness [Abdomen Soft] : soft [Normal Turgor] : normal skin turgor [Normal Color / Pigmentation] : normal skin color and pigmentation [No Focal Deficits] : no focal deficits [Normal Mood] : the mood was normal [Normal Affect] : the affect was normal

## 2024-03-11 NOTE — HISTORY OF PRESENT ILLNESS
[FreeTextEntry1] : 82 yo female here for follow up. Pt  almost one year. Back feeling well, functioning, still grieving. Pt wanted to review of labsand discuss colonoscopy.  Pt feeling better in mood, less anxious. No distress. No new Vaginal bleed, Thyroid stable. Pt needs colonoscopy 5 years from last one and last one was before COVID, so pt due 2024 or 2025. Will get new GI doctor. Some records from [facility] came in but did not have colonscopy results.   Pt eating well and sleeping well. [TextBox_19] : needs colonoscopy [Patient reported colon/rectal cancer screening was abnormal] : Patient reported colon/rectal cancer screening was abnormal [No falls in past year] : Patient reported no falls in the past year [Completely Independent] : Completely independent. [0] : 2) Feeling down, depressed, or hopeless: Not at all (0) [PHQ-2 Negative - No further assessment needed] : PHQ-2 Negative - No further assessment needed [[id number]] : 0

## 2024-03-18 ENCOUNTER — APPOINTMENT (OUTPATIENT)
Dept: CARDIOLOGY | Facility: CLINIC | Age: 84
End: 2024-03-18
Payer: MEDICARE

## 2024-03-18 ENCOUNTER — NON-APPOINTMENT (OUTPATIENT)
Age: 84
End: 2024-03-18

## 2024-03-18 VITALS
WEIGHT: 154 LBS | HEIGHT: 65 IN | DIASTOLIC BLOOD PRESSURE: 81 MMHG | BODY MASS INDEX: 25.66 KG/M2 | SYSTOLIC BLOOD PRESSURE: 145 MMHG | OXYGEN SATURATION: 96 % | HEART RATE: 61 BPM

## 2024-03-18 DIAGNOSIS — R06.09 OTHER FORMS OF DYSPNEA: ICD-10-CM

## 2024-03-18 DIAGNOSIS — R03.0 ELEVATED BLOOD-PRESSURE READING, W/OUT DIAGNOSIS OF HYPERTENSION: ICD-10-CM

## 2024-03-18 PROCEDURE — 93306 TTE W/DOPPLER COMPLETE: CPT

## 2024-03-18 PROCEDURE — 99204 OFFICE O/P NEW MOD 45 MIN: CPT

## 2024-03-18 PROCEDURE — 93000 ELECTROCARDIOGRAM COMPLETE: CPT

## 2024-03-18 NOTE — HISTORY OF PRESENT ILLNESS
[FreeTextEntry1] : 83-year-old woman seen for evaluation.  She has previously been evaluated by Dr. Landin.  She has no history of myocardial infarction angina hypertension diabetes.  She takes a small dose of Crestor for hyperlipidemia.  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  The notes indicate this was subsequently reviewed by Dr. Osuna who felt that no intervention was required as there was no compression.  She is not currently active physically.  She indicates she has some mild dyspnea.  She is a non-smoker.  Her  passed away suddenly a year ago.  She is going through grieving.

## 2024-03-18 NOTE — DISCUSSION/SUMMARY
[FreeTextEntry1] : 83-year-old woman elevated blood pressure may be situational.  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  Will obtain echo.  Maintain statin.  Low-sodium diet patient counseled. [EKG obtained to assist in diagnosis and management of assessed problem(s)] : EKG obtained to assist in diagnosis and management of assessed problem(s)

## 2024-03-18 NOTE — CARDIOLOGY SUMMARY
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

## 2024-03-18 NOTE — PHYSICAL EXAM
[Well Developed] : well developed [Well Nourished] : well nourished [No Acute Distress] : no acute distress [Normal Conjunctiva] : normal conjunctiva [No Carotid Bruit] : no carotid bruit [Normal Venous Pressure] : normal venous pressure [No Murmur] : no murmur [Normal S1, S2] : normal S1, S2 [No Rub] : no rub [No Gallop] : no gallop [Clear Lung Fields] : clear lung fields [Good Air Entry] : good air entry [No Respiratory Distress] : no respiratory distress  [Soft] : abdomen soft [Non Tender] : non-tender [No Masses/organomegaly] : no masses/organomegaly [Normal Gait] : normal gait [No Edema] : no edema [No Cyanosis] : no cyanosis [No Clubbing] : no clubbing [Normal Radial B/L] : normal radial B/L [Normal DP B/L] : normal DP B/L [No Rash] : no rash [Moves all extremities] : moves all extremities [No Focal Deficits] : no focal deficits [Normal Speech] : normal speech [Normal memory] : normal memory [Alert and Oriented] : alert and oriented

## 2024-03-19 ENCOUNTER — NON-APPOINTMENT (OUTPATIENT)
Age: 84
End: 2024-03-19

## 2024-03-19 ENCOUNTER — APPOINTMENT (OUTPATIENT)
Dept: OBGYN | Facility: CLINIC | Age: 84
End: 2024-03-19
Payer: MEDICARE

## 2024-03-19 VITALS
SYSTOLIC BLOOD PRESSURE: 130 MMHG | DIASTOLIC BLOOD PRESSURE: 68 MMHG | BODY MASS INDEX: 25.66 KG/M2 | HEIGHT: 65 IN | WEIGHT: 154 LBS

## 2024-03-19 DIAGNOSIS — Z01.411 ENCOUNTER FOR GYNECOLOGICAL EXAMINATION (GENERAL) (ROUTINE) WITH ABNORMAL FINDINGS: ICD-10-CM

## 2024-03-19 DIAGNOSIS — M85.80 OTHER SPECIFIED DISORDERS OF BONE DENSITY AND STRUCTURE, UNSPECIFIED SITE: ICD-10-CM

## 2024-03-19 DIAGNOSIS — D21.9 BENIGN NEOPLASM OF CONNECTIVE AND OTHER SOFT TISSUE, UNSPECIFIED: ICD-10-CM

## 2024-03-19 DIAGNOSIS — N90.5 ATROPHY OF VULVA: ICD-10-CM

## 2024-03-19 DIAGNOSIS — B00.9 HERPESVIRAL INFECTION, UNSPECIFIED: ICD-10-CM

## 2024-03-19 DIAGNOSIS — N39.3 STRESS INCONTINENCE (FEMALE) (MALE): ICD-10-CM

## 2024-03-19 PROCEDURE — G0328 FECAL BLOOD SCRN IMMUNOASSAY: CPT | Mod: QW

## 2024-03-19 PROCEDURE — G0101: CPT | Mod: GA

## 2024-04-03 ENCOUNTER — APPOINTMENT (OUTPATIENT)
Dept: INTERNAL MEDICINE | Facility: CLINIC | Age: 84
End: 2024-04-03
Payer: MEDICARE

## 2024-04-03 VITALS
WEIGHT: 154 LBS | BODY MASS INDEX: 25.66 KG/M2 | HEIGHT: 65 IN | DIASTOLIC BLOOD PRESSURE: 70 MMHG | SYSTOLIC BLOOD PRESSURE: 116 MMHG | HEART RATE: 70 BPM | RESPIRATION RATE: 16 BRPM | OXYGEN SATURATION: 97 % | TEMPERATURE: 96.9 F

## 2024-04-03 DIAGNOSIS — N39.41 URGE INCONTINENCE: ICD-10-CM

## 2024-04-03 DIAGNOSIS — Q24.5 MALFORMATION OF CORONARY VESSELS: ICD-10-CM

## 2024-04-03 DIAGNOSIS — E04.1 NONTOXIC SINGLE THYROID NODULE: ICD-10-CM

## 2024-04-03 DIAGNOSIS — E78.00 PURE HYPERCHOLESTEROLEMIA, UNSPECIFIED: ICD-10-CM

## 2024-04-03 DIAGNOSIS — K22.70 BARRETT'S ESOPHAGUS W/OUT DYSPLASIA: ICD-10-CM

## 2024-04-03 DIAGNOSIS — F41.9 ANXIETY DISORDER, UNSPECIFIED: ICD-10-CM

## 2024-04-03 DIAGNOSIS — N32.81 OVERACTIVE BLADDER: ICD-10-CM

## 2024-04-03 DIAGNOSIS — E04.2 NONTOXIC MULTINODULAR GOITER: ICD-10-CM

## 2024-04-03 DIAGNOSIS — E03.9 HYPOTHYROIDISM, UNSPECIFIED: ICD-10-CM

## 2024-04-03 DIAGNOSIS — F32.A ANXIETY DISORDER, UNSPECIFIED: ICD-10-CM

## 2024-04-03 DIAGNOSIS — E11.9 TYPE 2 DIABETES MELLITUS W/OUT COMPLICATIONS: ICD-10-CM

## 2024-04-03 DIAGNOSIS — K21.9 GASTRO-ESOPHAGEAL REFLUX DISEASE W/OUT ESOPHAGITIS: ICD-10-CM

## 2024-04-03 PROCEDURE — 99204 OFFICE O/P NEW MOD 45 MIN: CPT

## 2024-04-03 PROCEDURE — 99214 OFFICE O/P EST MOD 30 MIN: CPT

## 2024-04-03 NOTE — HISTORY OF PRESENT ILLNESS
[FreeTextEntry1] : Establish care [de-identified] : Pleasant 83-year-old white female comes in as a new patient to our practice, currently followed in endocrinology for type 2 diabetes, hypothyroidism, hyperlipidemia, saw her geriatrician last month for gait instability referred to PT, with chart history of fibromyalgia and anxiety on sertraline Wellbutrin, overactive bladder/LUTS, insomnia, short segment burch's without dysplasia, and benign thyroid nodules.  Has seen endocrinology and geriatrics within the past couple of months with blood work in February 2024 showing mild hypertriglyceridemia but excellent LDL and HDL, A1c 5.9% on Ozempic 0.5 mg subcu weekly, bone density December 2023 -2.1 worst hip, stable compared to prior study.  Patient continues to receive breast cancer screening, no personal or family history of breast cancer, last checked November 2023 coordinated through her gynecologist.  She has no acute concerns.

## 2024-04-03 NOTE — HEALTH RISK ASSESSMENT
[Very Good] : ~his/her~  mood as very good [No falls in past year] : Patient reported no falls in the past year [No] : In the past 12 months have you used drugs other than those required for medical reasons? No [0] : 1) Little interest or pleasure doing things: Not at all (0) [Audit-CScore] : 0 [PHQ-2 Negative - No further assessment needed] : PHQ-2 Negative - No further assessment needed [de-identified] : housework [de-identified] : healthy [[facility][note 3]] : 10 [[id number]] : 0 [Patient reported mammogram was normal] : Patient reported mammogram was normal [Patient reported PAP Smear was normal] : Patient reported PAP Smear was normal [Patient reported colonoscopy was normal] : Patient reported colonoscopy was normal [Patient reported bone density results were abnormal] : Patient reported bone density results were abnormal [HIV test declined] : HIV test declined [Hepatitis C test declined] : Hepatitis C test declined [Change in mental status noted] : No change in mental status noted [Behavior] : denies difficulty with behavior [Language] : denies difficulty with language [Learning/Retaining New Information] : denies difficulty learning/retaining new information [Handling Complex Tasks] : denies difficulty handling complex tasks [Reasoning] : denies difficulty with reasoning [Spatial Ability and Orientation] : denies difficulty with spatial ability and orientation [None] : None [Alone] : lives alone [College] : College [Retired] : retired [] :  [Sexually Active] : not sexually active [Feels Safe at Home] : Feels safe at home [High Risk Behavior] : no high risk behavior [Fully functional (bathing, dressing, toileting, transferring, walking, feeding)] : Fully functional (bathing, dressing, toileting, transferring, walking, feeding) [Fully functional (using the telephone, shopping, preparing meals, housekeeping, doing laundry, using] : Fully functional and needs no help or supervision to perform IADLs (using the telephone, shopping, preparing meals, housekeeping, doing laundry, using transportation, managing medications and managing finances) [Reports changes in hearing] : Reports no changes in hearing [Reports normal functional visual acuity (ie: able to read med bottle)] : Reports normal functional visual acuity [Reports changes in vision] : Reports no changes in vision [Smoke Detector] : smoke detector [Reports changes in dental health] : Reports no changes in dental health [Carbon Monoxide Detector] : carbon monoxide detector [Guns at Home] : no guns at home [Seat Belt] :  uses seat belt [Safety elements used in home] : safety elements used in home [Sunscreen] : does not use sunscreen [Travel to Developing Areas] : does not  travel to developing areas [TB Exposure] : is not being exposed to tuberculosis [Caregiver Concerns] : does not have caregiver concerns [BoneDensityDate] : 10/23 [ColonoscopyComments] : apparently [ColonoscopyDate] : 06/21 [de-identified] : bilateral cataract surgery 2023 [FreeTextEntry2] : samaniego [Never] : Never

## 2024-04-03 NOTE — ASSESSMENT
[FreeTextEntry1] : *Type 2 diabetes. Using a allison FreeStyle meter.   Highest A1c on record 7% in 2019, metformin intolerant, currenly on low-dose Ozempic 0.5 mg weekly A1c 5.9%.  No microalbuminuria.  No neuropathy or known retinopathy.  Care coordinated through her endocrinologist.  *Hypothyroidism. *Thyroid nodules.  On 50mcg levothyroxine replacement therapy.  TSH 2.0 February 2024 consistent with adequate repletion.Care coordinated through her endocrinologist.   12 and 13 mm nodules biopsied in 2021, 1 benign 1 indeterminate, but stable on follow-up imaging in 2023. Presume reimage 2025 via Endo.  *Hyperlipidemia. Crestor 5mg hs.  Lipid profile consistent with excellent control normal LFTs February 2024.Care coordinated through her endocrinologist. 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  *Chart history fibromyalgia and anxiety.   *Insomnia  sertraline 100 Wellbutrin 300. Provigil 100mg q AM, Ambien 5mg hs, xanax 0.5mg q day prn.  *Transient BP elevation. -130/70 generally. Saw cardiology 3/2024, TTE 67%LVEF with basal septal hypertrophy without apparent LVOT, normal PASP despite mild OR.  *Atrophic vaginitis.  Yuvafem via her gynecologist.  *Hip osteopenia. -2.1 worst hip, stable compared to prior study. Adequate vit D 2024.   *Short Segment Barretts. *Gerd Prilosec 20mg daily. Last EGD 2021? (no BE on path 2016 EGD). No red flag sxs at present.  *History of serrated adenomatous polyp. Apparently at age 51, with 3 yr surveillance spacing to q5hr, last 2021 pt recalls. Looking through outside records ([facility]) to find latest scope and path rpts.  *LUTS. Discussed bladder retraining avoidance of irritants. Option myrbetriq class as well. Nvr took meds for it.  *Routine adult health maintenance Vaccinations: Tdap uncertain will check with pharmacy Prevnar 20 2023 Shingrix 2018 UTD covid flu.  It was a pleasure visit with Ms. Ghotra today.  Answered all questions best I could. Disposition follow-up 6 months Time 45 minutes

## 2024-04-04 ENCOUNTER — APPOINTMENT (OUTPATIENT)
Dept: OBGYN | Facility: CLINIC | Age: 84
End: 2024-04-04
Payer: MEDICARE

## 2024-04-04 ENCOUNTER — ASOB RESULT (OUTPATIENT)
Age: 84
End: 2024-04-04

## 2024-04-04 PROCEDURE — 76830 TRANSVAGINAL US NON-OB: CPT

## 2024-04-08 ENCOUNTER — NON-APPOINTMENT (OUTPATIENT)
Age: 84
End: 2024-04-08

## 2024-04-10 ENCOUNTER — APPOINTMENT (OUTPATIENT)
Dept: INTERNAL MEDICINE | Facility: CLINIC | Age: 84
End: 2024-04-10
Payer: MEDICARE

## 2024-04-10 VITALS
OXYGEN SATURATION: 96 % | DIASTOLIC BLOOD PRESSURE: 74 MMHG | TEMPERATURE: 96.6 F | SYSTOLIC BLOOD PRESSURE: 120 MMHG | HEART RATE: 81 BPM | RESPIRATION RATE: 16 BRPM

## 2024-04-10 DIAGNOSIS — R05.8 OTHER SPECIFIED COUGH: ICD-10-CM

## 2024-04-10 PROCEDURE — 99213 OFFICE O/P EST LOW 20 MIN: CPT

## 2024-04-10 RX ORDER — BENZONATATE 100 MG/1
100 CAPSULE ORAL
Qty: 42 | Refills: 0 | Status: ACTIVE | COMMUNITY
Start: 2024-04-10 | End: 1900-01-01

## 2024-04-10 NOTE — HISTORY OF PRESENT ILLNESS
[FreeTextEntry8] : Most pleasant 83-year-old white female currently followed in endocrinology for type 2 diabetes, hypothyroidism, hyperlipidemia, chart history of fibromyalgia and anxiety on sertraline Wellbutrin, overactive bladder/LUTS, insomnia, short segment burch's without dysplasia, benign thyroid nodules, who presents with  No history of asthma COPD TESFAYE or smoking.

## 2024-04-10 NOTE — PHYSICAL EXAM
[No Lymphadenopathy] : no lymphadenopathy [Normal] : no respiratory distress, lungs were clear to auscultation bilaterally and no accessory muscle use [de-identified] : Minimal left frontal right maxillary sinus tenderness.  Mild right turbinate coryza.  Mild posterior oropharyngeal cobblestoning.  Hearing aids in both ears.

## 2024-04-10 NOTE — HISTORY OF PRESENT ILLNESS
[FreeTextEntry8] : Most pleasant 83-year-old white female currently followed in endocrinology for type 2 diabetes, hypothyroidism, hyperlipidemia, chart history of fibromyalgia and anxiety on sertraline Wellbutrin, overactive bladder/LUTS, insomnia, short segment burch's without dysplasia, benign thyroid nodules, who presents with nonproductive cough without wheeze, worse with recumbency, globus sensation, but no sore throat, voice changes, hoarseness, fever heartburn purulent rhinorrhea sinus pressure.  She had a cold a couple of weeks ago, but continues to be troubled by a lingering cough.  She did not cough once on our first visit, a week ago  No history of asthma COPD TESFAYE or smoking.    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

## 2024-04-10 NOTE — ASSESSMENT
[FreeTextEntry1] : *Postviral syndrome.  To treat her postnasal drip, I have asked her to use a [place] pot daily for a few days, and Tessalon Perles particularly at night for cough interfering with sleep.  If symptoms incompletely improved, she will add Flonase 1 spray each nostril twice daily for a few days.  If no response to [place] pot Tessalon, switch to pure Flonase.  If still no response, will offer short course antibiotics/prednisone.  Time 20 minutes

## 2024-04-25 ENCOUNTER — RX RENEWAL (OUTPATIENT)
Age: 84
End: 2024-04-25

## 2024-04-25 RX ORDER — ESTRADIOL 10 UG/1
10 TABLET VAGINAL
Qty: 24 | Refills: 3 | Status: ACTIVE | COMMUNITY
Start: 2022-03-16 | End: 1900-01-01

## 2024-04-26 ENCOUNTER — RX RENEWAL (OUTPATIENT)
Age: 84
End: 2024-04-26

## 2024-04-26 RX ORDER — OMEPRAZOLE 40 MG/1
40 CAPSULE, DELAYED RELEASE ORAL
Qty: 90 | Refills: 1 | Status: ACTIVE | COMMUNITY
Start: 2019-12-26 | End: 1900-01-01

## 2024-06-14 NOTE — HISTORY OF PRESENT ILLNESS
Status: Final  MCH                                           Date: 06/14/2024  Value: 29.2        Ref range: 26.0 - 34.0 PG     Status: Final  MCHC                                          Date: 06/14/2024  Value: 33.4        Ref range: 30.0 - 36.5 g/dL   Status: Final  RDW                                           Date: 06/14/2024  Value: 13.2        Ref range: 11.5 - 14.5 %      Status: Final  Platelets                                     Date: 06/14/2024  Value: 181         Ref range: 150 - 400 K/uL     Status: Final  MPV                                           Date: 06/14/2024  Value: 9.6         Ref range: 8.9 - 12.9 FL      Status: Final  Nucleated RBCs                                Date: 06/14/2024  Value: 0.0         Ref range: 0  WBC      Status: Final  nRBC                                          Date: 06/14/2024  Value: 0.00        Ref range: 0.00 - 0.01 K/uL   Status: Final  ------------    Radiology last 7 days:  No results found.     [unfilled]    Discharge Medications    Current Discharge Medication List    START taking these medications    oxyCODONE-acetaminophen (PERCOCET) 5-325 MG per tablet  Take 1 tablet by mouth every 6 hours as needed for Pain for up to 7 days. Intended supply: 3 days. Take lowest dose possible to manage pain Max Daily Amount: 4 tablets  Qty: 20 tablet Refills: 0  Comments: Reduce doses taken as pain becomes manageable  Associated Diagnoses:Incarcerated incisional hernia    ibuprofen (ADVIL;MOTRIN) 600 MG tablet  Take 1 tablet by mouth 3 times daily as needed for Pain  Qty: 30 tablet Refills: 0          Current Discharge Medication List        Current Discharge Medication List    CONTINUE these medications which have NOT CHANGED    Levomefolate Glucosamine (METHYL-FOLATE PO)  Take by mouth    NONFORMULARY  AG1 SYMBIOTIC  COLLOSTRUM    Multiple Vitamins-Minerals (PRESERVISION AREDS PO)  Take by mouth    famotidine (PEPCID) 10 MG tablet  Take 1 tablet by mouth as  [de-identified] : 80 F for cpe. Tx'ed for depression,hypothroid with small nodules -? follow up for nodules w bx,lipidemia,diabetes type2,osteoarthritis. Pt has lost wt and diabetes is well controlled. NO sympt of end organ d. 3P's neg. GI,,Gyne neg. Had mammog, gyne exam,vaccinations.

## 2024-06-19 ENCOUNTER — RX RENEWAL (OUTPATIENT)
Age: 84
End: 2024-06-19

## 2024-06-19 RX ORDER — LEVOTHYROXINE SODIUM 0.05 MG/1
50 TABLET ORAL
Qty: 90 | Refills: 3 | Status: ACTIVE | COMMUNITY
Start: 2019-09-16 | End: 1900-01-01

## 2024-07-22 ENCOUNTER — TRANSCRIPTION ENCOUNTER (OUTPATIENT)
Age: 84
End: 2024-07-22

## 2024-08-07 ENCOUNTER — APPOINTMENT (OUTPATIENT)
Dept: ENDOCRINOLOGY | Facility: CLINIC | Age: 84
End: 2024-08-07

## 2024-08-07 PROCEDURE — 99214 OFFICE O/P EST MOD 30 MIN: CPT

## 2024-08-07 PROCEDURE — 95251 CONT GLUC MNTR ANALYSIS I&R: CPT

## 2024-08-07 PROCEDURE — 83036 HEMOGLOBIN GLYCOSYLATED A1C: CPT | Mod: QW

## 2024-08-07 NOTE — PHYSICAL EXAM
[Alert] : alert [Well Nourished] : well nourished [No Acute Distress] : no acute distress [Well Developed] : well developed [Normal Sclera/Conjunctiva] : normal sclera/conjunctiva [EOMI] : extra ocular movement intact [No Proptosis] : no proptosis [Normal Oropharynx] : the oropharynx was normal [Thyroid Not Enlarged] : the thyroid was not enlarged [No Thyroid Nodules] : no palpable thyroid nodules [No Respiratory Distress] : no respiratory distress [No Accessory Muscle Use] : no accessory muscle use [Clear to Auscultation] : lungs were clear to auscultation bilaterally [Normal S1, S2] : normal S1 and S2 [Normal Rate] : heart rate was normal [Regular Rhythm] : with a regular rhythm [No Edema] : no peripheral edema [Pedal Pulses Normal] : the pedal pulses are present [Soft] : abdomen soft [Normal Anterior Cervical Nodes] : no anterior cervical lymphadenopathy [No Spinal Tenderness] : no spinal tenderness [Spine Straight] : spine straight [No Stigmata of Cushings Syndrome] : no stigmata of Cushings Syndrome [Normal Gait] : normal gait [No Involuntary Movements] : no involuntary movements were seen [Normal Strength/Tone] : muscle strength and tone were normal [No Rash] : no rash [Right foot was examined, including] : right foot ~C was examined, including visual inspection with sensory and pulse exams [Left foot was examined, including] : left foot ~C was examined, including visual inspection with sensory and pulse exams [No Tremors] : no tremors [Oriented x3] : oriented to person, place, and time [Normal Affect] : the affect was normal [Normal Mood] : the mood was normal [Acanthosis Nigricans] : no acanthosis nigricans [Delayed in the Right Toes] : normal in the toes [Normal] : normal [Full ROM] : with full range of motion [Delayed in the Left Toes] : normal in the toes [Diminished Throughout Both Feet] : normal tactile sensation with monofilament testing throughout both feet

## 2024-08-07 NOTE — ASSESSMENT
[FreeTextEntry1] : 84F presents for initial evaluation of DM2, hypothyroidism, osteoporosis.  1) DM2 - managed with Ozempic prior intolerance to metformin HbA1c 6.1% well controlled  Has been well controlled, at goal (reviewed that given age A1c could be higher but she is very happy with results of Ozempic and would like to stay on it). Continue Ozempic 0.5mg SQ weekly Discussed watching high carb items, junk food since she gained 7 lbs and does not want to regain more. continue strategies for carb consistent diet and physical activity.  Continue use of Avinash notify me if develops hypoglycemia - verify with fingerstick if see low glucose on Avinash. BP stable,  alb/creat negative 2024 Follow up ophtho, DM foot precautions  2) Hypothyroidism TFT at goal previously, continue levothyroxine 50 mcg po daily, take in AM on empty stomach check TFTs  3) Thyroid nodules bilateral nodules, largest 1.3cm, LLP nodule benign on FNA, RMP nodule nondiagnostic. manage conservatively for now, repeat thyroid US 6 months initially recommended (she wished to wait additional 6 months). US 10/2023 - stable thyroid nodules: right midpole 1.2 x 0.5 x 0.8 cm hypoechoic, lower pole 0.8 x 0.7 x 0.7 hypoechoic. Left lower 1.0x 0.9 x0.9 hypoechoic can repeat in about 1 year - will order and can be done before next visit  4) Osteoporosis managed by rheumatology, Reclast last given 1/22/2020, last DXA 12/2021 osteopenia range: T score -1.5 at hip, normal BMD at spine). Monitor conservatively for now. Dairy in diet, vit D supplement. Completed DXA 12/2023 - in osteopenia range at hip (normal at spine). Spine T score 1.3, fem neck -1.6, total hip -2.1. Next DXA  12/2025  5) Hyperlipidemia continue rosuvastatin 5mg  follow up 6 months.

## 2024-08-07 NOTE — REVIEW OF SYSTEMS
[Negative] : Heme/Lymph [Recent Weight Gain (___ Lbs)] : recent weight gain: [unfilled] lbs [Recent Weight Loss (___ Lbs)] : no recent weight loss

## 2024-08-07 NOTE — HISTORY OF PRESENT ILLNESS
[FreeTextEntry1] : 84F presents to follow up for DM2 management No known complications, denies retinopathy, neuropathy, nephropathy. Had tried metformin developed nausea and unwell could not tolerate.  She continues on on Avinash 2 and Ozempic 0.5mg once weekly.  passed away suddenly February 2023, has had a difficult time but is now adjusting better with time.  Recently she reports having more treats (chocolate, ice cream). In the past on Ozempic she did not have the cravings but now she has the cravings back.  Using Avinash 2, download reviewed, see full report scanned into allscripts: avg glu 116 97% TIR 0% very high 3% high 0% low 0% very low Denies hypoglycemia symptoms.  History of long term acquired hypothyroidism on levothyroxine 50 mcg many since young age. Patient also was found with thyroid nodules on ultrasound, has not had FNA. Saw Dr. Mckee for FNA aug 2021 - LLP nodule benign (category 2). RMP nodule - cat 1 nondiagnostic. US 10/2023 - stable thyroid nodules: right midpole 1.2 x 0.5 x 0.8 cm hypoechoic, lower pole 0.8 x 0.7 x 0.7 hypoechoic. Left lower 1.0x 0.9 x0.9 hypoechoic.  Also has history of osteoporosis, follows long term with a rheumatologist Dr. Chasidy John and receives Reclast infusions last dose received 1/22/2020. Had DXA 12/2021 osteopenia range and improved from prior.  Completed DXA 12/2023 - in osteopenia range at hip (normal at spine). Spine T score 1.3, fem neck -1.6, total hip -2.1.

## 2024-09-11 ENCOUNTER — RX RENEWAL (OUTPATIENT)
Age: 84
End: 2024-09-11

## 2024-09-12 ENCOUNTER — APPOINTMENT (OUTPATIENT)
Dept: GERIATRICS | Facility: CLINIC | Age: 84
End: 2024-09-12

## 2024-09-25 ENCOUNTER — APPOINTMENT (OUTPATIENT)
Dept: INTERNAL MEDICINE | Facility: CLINIC | Age: 84
End: 2024-09-25

## 2024-09-25 ENCOUNTER — NON-APPOINTMENT (OUTPATIENT)
Age: 84
End: 2024-09-25

## 2024-09-25 ENCOUNTER — EMERGENCY (EMERGENCY)
Facility: HOSPITAL | Age: 84
LOS: 1 days | Discharge: HOME | End: 2024-09-25
Attending: STUDENT IN AN ORGANIZED HEALTH CARE EDUCATION/TRAINING PROGRAM | Admitting: STUDENT IN AN ORGANIZED HEALTH CARE EDUCATION/TRAINING PROGRAM
Payer: MEDICARE

## 2024-09-25 VITALS
HEART RATE: 62 BPM | SYSTOLIC BLOOD PRESSURE: 124 MMHG | OXYGEN SATURATION: 100 % | DIASTOLIC BLOOD PRESSURE: 78 MMHG | RESPIRATION RATE: 17 BRPM

## 2024-09-25 VITALS
SYSTOLIC BLOOD PRESSURE: 156 MMHG | RESPIRATION RATE: 18 BRPM | TEMPERATURE: 98 F | DIASTOLIC BLOOD PRESSURE: 94 MMHG | HEIGHT: 65 IN | HEART RATE: 66 BPM | WEIGHT: 162.92 LBS | OXYGEN SATURATION: 100 %

## 2024-09-25 DIAGNOSIS — Z96.652 PRESENCE OF LEFT ARTIFICIAL KNEE JOINT: Chronic | ICD-10-CM

## 2024-09-25 PROCEDURE — 99285 EMERGENCY DEPT VISIT HI MDM: CPT

## 2024-09-25 PROCEDURE — 70450 CT HEAD/BRAIN W/O DYE: CPT | Mod: MC

## 2024-09-25 PROCEDURE — 73090 X-RAY EXAM OF FOREARM: CPT

## 2024-09-25 PROCEDURE — 73110 X-RAY EXAM OF WRIST: CPT | Mod: 26,RT

## 2024-09-25 PROCEDURE — 70450 CT HEAD/BRAIN W/O DYE: CPT | Mod: 26,MC

## 2024-09-25 PROCEDURE — 90715 TDAP VACCINE 7 YRS/> IM: CPT

## 2024-09-25 PROCEDURE — 90471 IMMUNIZATION ADMIN: CPT

## 2024-09-25 PROCEDURE — 73090 X-RAY EXAM OF FOREARM: CPT | Mod: 26,LT

## 2024-09-25 PROCEDURE — 99284 EMERGENCY DEPT VISIT MOD MDM: CPT | Mod: 25

## 2024-09-25 PROCEDURE — 73110 X-RAY EXAM OF WRIST: CPT

## 2024-09-25 RX ORDER — TETANUS TOXOID, REDUCED DIPHTHERIA TOXOID AND ACELLULAR PERTUSSIS VACCINE, ADSORBED 5; 2.5; 8; 8; 2.5 [IU]/.5ML; [IU]/.5ML; UG/.5ML; UG/.5ML; UG/.5ML
0.5 SUSPENSION INTRAMUSCULAR ONCE
Refills: 0 | Status: COMPLETED | OUTPATIENT
Start: 2024-09-25 | End: 2024-09-25

## 2024-09-25 RX ORDER — ACETAMINOPHEN 325 MG/1
650 TABLET ORAL ONCE
Refills: 0 | Status: COMPLETED | OUTPATIENT
Start: 2024-09-25 | End: 2024-09-25

## 2024-09-25 RX ADMIN — TETANUS TOXOID, REDUCED DIPHTHERIA TOXOID AND ACELLULAR PERTUSSIS VACCINE, ADSORBED 0.5 MILLILITER(S): 5; 2.5; 8; 8; 2.5 SUSPENSION INTRAMUSCULAR at 12:54

## 2024-09-25 RX ADMIN — ACETAMINOPHEN 650 MILLIGRAM(S): 325 TABLET ORAL at 13:24

## 2024-09-25 RX ADMIN — ACETAMINOPHEN 650 MILLIGRAM(S): 325 TABLET ORAL at 12:54

## 2024-09-25 NOTE — ED PROVIDER NOTE - OBJECTIVE STATEMENT
84-year-old female with a history of hypertension, hyperlipidemia, diabetes, anxiety presents with mechanical fall.  Patient was walking to her doctor's office when she tripped on the sidewalk fell onto her right arm and hit her face.  Denies syncope nausea vomiting vision change chest pain shortness of breath difficulty breathing.  Complaining of right forearm pain.  Sustained a laceration of her lip.  Tetanus not up to date

## 2024-09-25 NOTE — CHART NOTE - NSCHARTNOTEFT_GEN_A_CORE
84 y o female presenting to the ED on 9/25/24 for fall as per chart.  ED schedule coordinator assisted the patient with the recommended orthopedic follow up appointment on 10/1/24 @ 10:00 am with Dr. Lemus.  Patient was agreeable.

## 2024-09-25 NOTE — ED PROVIDER NOTE - NSFOLLOWUPINSTRUCTIONS_ED_ALL_ED_FT
Fall Prevention in the Home, Adult  Falls can cause injuries. They can happen to people of all ages. There are many things you can do to make your home safe and to help prevent falls. Ask for help when making these changes, if needed.    What actions can I take to prevent falls?  General Instructions     Use good lighting in all rooms. Replace any light bulbs that burn out.  Turn on the lights when you go into a dark area. Use night-lights.  Keep items that you use often in easy-to-reach places. Lower the shelves around your home if necessary.  Set up your furniture so you have a clear path. Avoid moving your furniture around.  Do not have throw rugs and other things on the floor that can make you trip.  Avoid walking on wet floors.  If any of your floors are uneven, fix them.  Add color or contrast paint or tape to clearly zackery and help you see:  Any grab bars or handrails.  First and last steps of stairways.  Where the edge of each step is.  If you use a stepladder:  Make sure that it is fully opened. Do not climb a closed stepladder.  Make sure that both sides of the stepladder are locked into place.  Ask someone to hold the stepladder for you while you use it.  If there are any pets around you, be aware of where they are.  What can I do in the bathroom?     Keep the floor dry. Clean up any water that spills onto the floor as soon as it happens.  Remove soap buildup in the tub or shower regularly.  Use non-skid mats or decals on the floor of the tub or shower.  Attach bath mats securely with double-sided, non-slip rug tape.  If you need to sit down in the shower, use a plastic, non-slip stool.  Install grab bars by the toilet and in the tub and shower. Do not use towel bars as grab bars.  What can I do in the bedroom?     Make sure that you have a light by your bed that is easy to reach.  Do not use any sheets or blankets that are too big for your bed. They should not hang down onto the floor.  Have a firm chair that has side arms. You can use this for support while you get dressed.  What can I do in the kitchen?     Clean up any spills right away.  If you need to reach something above you, use a strong step stool that has a grab bar.  Keep electrical cords out of the way.  Do not use floor polish or wax that makes floors slippery. If you must use wax, use non-skid floor wax.  What can I do with my stairs?     Do not leave any items on the stairs.  Make sure that you have a light switch at the top of the stairs and the bottom of the stairs. If you do not have them, ask someone to add them for you.  Make sure that there are handrails on both sides of the stairs, and use them. Fix handrails that are broken or loose. Make sure that handrails are as long as the stairways.  Install non-slip stair treads on all stairs in your home.  Avoid having throw rugs at the top or bottom of the stairs. If you do have throw rugs, attach them to the floor with carpet tape.  Choose a carpet that does not hide the edge of the steps on the stairway.  Check any carpeting to make sure that it is firmly attached to the stairs. Fix any carpet that is loose or worn.  What can I do on the outside of my home?     Use bright outdoor lighting.  Regularly fix the edges of walkways and driveways and fix any cracks.  Remove anything that might make you trip as you walk through a door, such as a raised step or threshold.  Trim any bushes or trees on the path to your home.  Regularly check to see if handrails are loose or broken. Make sure that both sides of any steps have handrails.  Install guardrails along the edges of any raised decks and porches.  Clear walking paths of anything that might make someone trip, such as tools or rocks.  Have any leaves, snow, or ice cleared regularly.  Use sand or salt on walking paths during winter.  Clean up any spills in your garage right away. This includes grease or oil spills.  What other actions can I take?     Wear shoes that:  Have a low heel. Do not wear high heels.  Have rubber bottoms.  Are comfortable and fit you well.  Are closed at the toe. Do not wear open-toe sandals.  Use tools that help you move around (mobility aids) if they are needed. These include:  Canes.  Walkers.  Scooters.  Crutches.  Review your medicines with your doctor. Some medicines can make you feel dizzy. This can increase your chance of falling.  Ask your doctor what other things you can do to help prevent falls.    Where to find more information  Centers for Disease Control and PreventionROSALINE: https://cdc.gov  National [place] on Aging: https://ly1zlbv.bettie.nih.gov  Contact a doctor if:  You are afraid of falling at home.  You feel weak, drowsy, or dizzy at home.  You fall at home.  Summary  There are many simple things that you can do to make your home safe and to help prevent falls.  Ways to make your home safe include removing tripping hazards and installing grab bars in the bathroom.  Ask for help when making these changes in your home.  This information is not intended to replace advice given to you by your health care provider. Make sure you discuss any questions you have with your health care provider.

## 2024-09-25 NOTE — ED PROVIDER NOTE - PATIENT PORTAL LINK FT
You can access the FollowMyHealth Patient Portal offered by [facility] by registering at the following website: [email or web address]. By joining [facility]’s FollowMyHealth portal, you will also be able to view your health information using other applications (apps) compatible with our system.

## 2024-09-25 NOTE — ED ADULT TRIAGE NOTE - CHIEF COMPLAINT QUOTE
Patient BIB EMS s/p mechanical fall on sidewalk. Denies LOC, not on blood thinners. Patient has laceration to chin, complains of right knee and elbow pain. A&Ox4

## 2024-09-25 NOTE — ASSESSMENT
[FreeTextEntry1] : *Type 2 diabetes. Using a allison FreeStyle meter. Highest A1c on record 7% in 2019, metformin intolerant, currenly on low-dose Ozempic 0.5 mg weekly A1c 5.9%. No microalbuminuria. No neuropathy or known retinopathy. Care coordinated through her endocrinologist.  *Hypothyroidism. *Thyroid nodules. On 50mcg levothyroxine replacement therapy. TSH 2.0 February 2024 consistent with adequate repletion.Care coordinated through her endocrinologist. 12 and 13 mm nodules biopsied in 2021, 1 benign 1 indeterminate, but stable on follow-up imaging in 2023. Presume reimage 2025 via Endo.  *Hyperlipidemia. Crestor 5mg hs. Lipid profile consistent with excellent control normal LFTs February 2024.Care coordinated through her endocrinologist. 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  *Chart history fibromyalgia and anxiety. *Insomnia sertraline 100 Wellbutrin 300. Provigil 100mg q AM, Ambien 5mg hs, xanax 0.5mg q day prn.  *Transient BP elevation. -130/70 generally. Saw cardiology 3/2024, TTE 67%LVEF with basal septal hypertrophy without apparent LVOT, normal PASP despite mild [place].  *Atrophic vaginitis. Yuvafem via her gynecologist.  *Hip osteopenia. -2.1 worst hip, stable compared to prior study. Adequate vit D 2024.  *Short Segment Barretts. *Gerd Prilosec 20mg daily. Last EGD 2021? (no BE on path 2016 EGD). No red flag sxs at present.  *History of serrated adenomatous polyp. Apparently at age 51, with 3 yr surveillance spacing to q5hr, last 2021 pt recalls. Looking through outside records ([employer]) to find latest scope and path rpts.  *LUTS. Discussed bladder retraining avoidance of irritants. Option myrbetriq class as well. Nvr took meds for it.  *Routine adult health maintenance Vaccinations: Tdap uncertain will check with pharmacy Prevnar 20 2023 Shingrix 2018 UTD covid flu.  It was a pleasure visit with Ms. Ghotra today. Answered all questions best I could. Disposition follow-up 6 months Time patient had to cancel, trip and fall injury in the parking lot going to the hospital.  Appears to be primarily superficial lacerations but need to rule out fracture

## 2024-09-25 NOTE — ED ADULT NURSE NOTE - OBJECTIVE STATEMENT
PAtient presents with mechanical fall.  Patient was walking to her doctor's office when she tripped on the sidewalk fell onto her right arm and hit her face.  Denies syncope nausea vomiting vision change chest pain shortness of breath difficulty breathing.  Complaining of right forearm pain. Alert and oriented x 4.

## 2024-09-25 NOTE — ED ADULT NURSE NOTE - NSFALLRISKINTERV_ED_ALL_ED

## 2024-09-25 NOTE — HISTORY OF PRESENT ILLNESS
[FreeTextEntry1] : Patient had to cancel trip and fall injury in the parking lot [de-identified] : Pleasant 84-year-old white female comes in for 6 month follow up, also followed in endocrinology for type 2 diabetes, hypothyroidism, hyperlipidemia, fibromyalgia and anxiety on sertraline Wellbutrin, overactive bladder/LUTS, insomnia, short segment burch's without dysplasia, and benign thyroid nodules.  Has seen endocrinology and geriatrics within the past couple of months with blood work in February 2024 showing mild hypertriglyceridemia but excellent LDL and HDL, A1c 5.9% on Ozempic 0.5 mg subcu weekly, bone density December 2023 -2.1 worst hip, stable compared to prior study.  Patient continues to receive breast cancer screening, no personal or family history of breast cancer, last checked November 2023 coordinated through her gynecologist.  She has no acute concerns.

## 2024-09-25 NOTE — ED PROVIDER NOTE - PHYSICAL EXAMINATION
CONSTITUTIONAL: NAD  SKIN: Warm dry  HEAD: +.5 horizontal laceration of inner lip, no active bleeding, abrasion to outer lower lip  EYES: NL inspection  ENT: MMM  NECK: Supple; non tender.  CARD: RRR  RESP: CTAB  ABD: S/NT no R/G  EXT: no pedal edema  +RT arm no snuffbox ttp, no mcp ttp, FROM active/passive, mid forearm ttp, FROM active/passive, no elbow ttp  NEURO: Grossly unremarkable  PSYCH: Cooperative, appropriate.

## 2024-09-25 NOTE — ED PROVIDER NOTE - CLINICAL SUMMARY MEDICAL DECISION MAKING FREE TEXT BOX
84-year-old female with a history of hypertension, hyperlipidemia, diabetes, anxiety presents with mechanical fall.  Patient was walking to her doctor's office when she tripped on the sidewalk fell onto her right arm and hit her face.  Denies syncope nausea vomiting vision change chest pain shortness of breath difficulty breathing.  Complaining of right forearm pain.  Sustained a laceration of her lip.  Tetanus not up to date  Exam as stated  XR wnl, CT head neg. uptodate tetatnus  Patient to be discharged from ED. Any available test results were discussed with patient and/or family. Verbal instructions given, including instructions to return to ED immediately for any new, worsening, or concerning symptoms. Patient endorsed understanding. Written discharge instructions additionally given, including follow-up plan. 84-year-old female with a history of hypertension, hyperlipidemia, diabetes, anxiety presents with mechanical fall.  Patient was walking to her doctor's office when she tripped on the sidewalk fell onto her right arm and hit her face.  Denies syncope nausea vomiting vision change chest pain shortness of breath difficulty breathing.  Complaining of right forearm pain.  Sustained a laceration of her lip.  Tetanus not up to date  Exam as stated  XR wnl, CT head neg. uptodate tetatnus  Patient to be discharged from ED. Any available test results were discussed with patient and/or family. Verbal instructions given, including instructions to return to ED immediately for any new, worsening, or concerning symptoms. Patient endorsed understanding. Written discharge instructions additionally given, including follow-up plan.    @3:30pm radiology called - pt found to have minimally displaced radial head fx - called patient who was home. Will place in a sling she has before but deferred returning to Ed for splint.  Spoke to Graciela MARIE who will schedule ortho apt. Strict return precautions given

## 2024-09-26 ENCOUNTER — NON-APPOINTMENT (OUTPATIENT)
Age: 84
End: 2024-09-26

## 2024-09-26 ENCOUNTER — APPOINTMENT (OUTPATIENT)
Dept: OTOLARYNGOLOGY | Facility: CLINIC | Age: 84
End: 2024-09-26

## 2024-09-27 ENCOUNTER — NON-APPOINTMENT (OUTPATIENT)
Age: 84
End: 2024-09-27

## 2024-10-01 ENCOUNTER — NON-APPOINTMENT (OUTPATIENT)
Age: 84
End: 2024-10-01

## 2024-10-01 ENCOUNTER — APPOINTMENT (OUTPATIENT)
Dept: ORTHOPEDIC SURGERY | Facility: CLINIC | Age: 84
End: 2024-10-01
Payer: MEDICARE

## 2024-10-01 VITALS — BODY MASS INDEX: 26.82 KG/M2 | HEIGHT: 65 IN | WEIGHT: 161 LBS

## 2024-10-01 DIAGNOSIS — S52.123A DISPLACED FRACTURE OF HEAD OF UNSPECIFIED RADIUS, INITIAL ENCOUNTER FOR CLOSED FRACTURE: ICD-10-CM

## 2024-10-01 PROCEDURE — 73080 X-RAY EXAM OF ELBOW: CPT | Mod: RT

## 2024-10-01 PROCEDURE — 99213 OFFICE O/P EST LOW 20 MIN: CPT

## 2024-10-01 NOTE — PHYSICAL EXAM
[de-identified] : Patient is WDWN, alert, and in no acute distress. Breathing is unlabored. She is grossly oriented to person, place, and time.   Right Elbow: Inspection/Palpation: There is tenderness to palpation over the radial head. Mild edema noted.  Range of Motion: Limited ROM 10 - 122 Sensation is intact and normal.  [de-identified] : AP, lateral and oblique views of the right elbow were obtained today and revealed no displaced right radial head fracture.

## 2024-10-01 NOTE — DISCUSSION/SUMMARY
[de-identified] : The underlying pathophysiology was reviewed with the patient. XR films were reviewed with the patient. Discussed at length the nature of the patients condition. Their right elbow symptoms appear secondary to right radial head fracture. The patient and I discussed her options regarding treatment.   Patient was advised to take OTC medications and topical analgesic for pain management.   Activity modifications were reviewed with the patient at length. Gentle range of motion and strengthening exercises were encouraged, as tolerated by pain. She was encouraged to move her elbow as much as possible.  The patient was encouraged to take Calcium Citrate, Vitamin D3 and Vitamin C along with a high calcium diet is advised to promote bone health and healing.   All questions answered, understanding verbalized. Patient in agreement with plan of care.  The patient should follow-up in one month for further assessment of her symptoms.

## 2024-10-01 NOTE — HISTORY OF PRESENT ILLNESS
[de-identified] : Pt is an 85 y/o female with right radial head fracture.  She tripped and fell in the parking lot of this building on 9/25/24.  She went to the ED where xrays revealed a right radial head fracture.  A sling was applied and she was advised to follow up with a specialist.  She is not having much pain.

## 2024-10-01 NOTE — ADDENDUM
[FreeTextEntry1] : I, Vaibhav Merritt Jr, acted solely as a scribe for Dr. Rogers Lemus on this date 10/01/2024  All medical record entries made by the Scribe were at my, Dr. Rogers Lemus, direction and personally dictated by me on 10/01/2024 . I have reviewed the chart and agree that the record accurately reflects my personal performance of the history, physical exam, assessment and plan. I have also personally directed, reviewed, and agreed with the chart.

## 2024-10-10 ENCOUNTER — OUTPATIENT (OUTPATIENT)
Dept: OUTPATIENT SERVICES | Facility: HOSPITAL | Age: 84
LOS: 1 days | End: 2024-10-10
Payer: MEDICARE

## 2024-10-10 ENCOUNTER — APPOINTMENT (OUTPATIENT)
Dept: ULTRASOUND IMAGING | Facility: HOSPITAL | Age: 84
End: 2024-10-10
Payer: MEDICARE

## 2024-10-10 DIAGNOSIS — Z96.652 PRESENCE OF LEFT ARTIFICIAL KNEE JOINT: Chronic | ICD-10-CM

## 2024-10-10 DIAGNOSIS — E04.1 NONTOXIC SINGLE THYROID NODULE: ICD-10-CM

## 2024-10-10 PROCEDURE — 76536 US EXAM OF HEAD AND NECK: CPT | Mod: 26

## 2024-10-16 ENCOUNTER — APPOINTMENT (OUTPATIENT)
Dept: INTERNAL MEDICINE | Facility: CLINIC | Age: 84
End: 2024-10-16
Payer: MEDICARE

## 2024-10-16 VITALS
DIASTOLIC BLOOD PRESSURE: 80 MMHG | HEIGHT: 65 IN | RESPIRATION RATE: 16 BRPM | SYSTOLIC BLOOD PRESSURE: 126 MMHG | TEMPERATURE: 97.6 F | HEART RATE: 80 BPM | BODY MASS INDEX: 26.99 KG/M2 | OXYGEN SATURATION: 96 % | WEIGHT: 162 LBS

## 2024-10-16 DIAGNOSIS — N32.81 OVERACTIVE BLADDER: ICD-10-CM

## 2024-10-16 DIAGNOSIS — M85.80 OTHER SPECIFIED DISORDERS OF BONE DENSITY AND STRUCTURE, UNSPECIFIED SITE: ICD-10-CM

## 2024-10-16 DIAGNOSIS — E11.9 TYPE 2 DIABETES MELLITUS W/OUT COMPLICATIONS: ICD-10-CM

## 2024-10-16 DIAGNOSIS — E04.2 NONTOXIC MULTINODULAR GOITER: ICD-10-CM

## 2024-10-16 DIAGNOSIS — K21.9 GASTRO-ESOPHAGEAL REFLUX DISEASE W/OUT ESOPHAGITIS: ICD-10-CM

## 2024-10-16 DIAGNOSIS — K22.70 BARRETT'S ESOPHAGUS W/OUT DYSPLASIA: ICD-10-CM

## 2024-10-16 PROCEDURE — 99214 OFFICE O/P EST MOD 30 MIN: CPT

## 2024-10-30 ENCOUNTER — TRANSCRIPTION ENCOUNTER (OUTPATIENT)
Age: 84
End: 2024-10-30

## 2024-11-06 NOTE — ASU PATIENT PROFILE, ADULT - NS PRO AD PATIENT TYPE ON CHART
Shave Biopsy Wound Care    Your doctor has performed a shave biopsy today.  A band aid and vaseline ointment has been placed over the site.  This should remain in place for 24 hours.  It is recommended that you keep the area dry for the first 24 hours.  After 24 hours, you may remove the band aid and wash the area with warm soap and water and apply Vaseline jelly.  Many patients prefer to use Neosporin or Bacitracin ointment.  This is acceptable; however, know that you can develop an allergy to this medication even if you have used it safely for years.  It is important to keep the area moist.  Letting it dry out and get air slows healing time, and will worsen the scar.  Band aid is optional after first 24 hours.      If you notice increasing redness, tenderness, pain, or yellow drainage at the biopsy site, please notify your doctor.  These are signs of an infection.    If your biopsy site is bleeding, apply firm pressure for 15 minutes straight.  Repeat for another 15 minutes, if it is still bleeding.   If the surgical site continues to bleed, then please contact your doctor.      [facility] [place] [address]/ [phone] FAX/ [email or web address]     Punch Biopsy Wound Care    Your doctor has performed a punch biopsy today.  A band aid and antibiotic ointment has been placed over the site.  This should remain in place for 24 hours.  It is recommended that you keep the area dry for the first 24 hours.  After 24 hours, you may remove the band aid and wash the area with warm soap and water and apply Vaseline jelly.  Many patients prefer to use Neosporin or Bacitracin ointment.  This is acceptable; however know that you can develop an allergy to this medication even if you have used it safely for years.  It is important to keep the area moist.  Letting it dry out and get air slows healing time, will worsen the scar, and make it more difficult to remove the stitches if they were placed.   Band aid is optional after first 24 hours.      If you notice increasing redness, tenderness, pain, or yellow drainage at the biopsy or surgical site, please notify your doctor.  These are signs of an infection.    If your biopsy/surgical site is bleeding, apply firm pressure for 15 minutes straight.  Repeat for another 15 minutes, if it is still bleeding.   If the surgical site continues to bleed, then please contact your doctor.      [facility] [place] [address]/ [phone] FAX/ [email or web address]         Health Care Proxy (HCP)

## 2024-11-12 ENCOUNTER — APPOINTMENT (OUTPATIENT)
Dept: ORTHOPEDIC SURGERY | Facility: CLINIC | Age: 84
End: 2024-11-12
Payer: MEDICARE

## 2024-11-12 VITALS — WEIGHT: 162 LBS | HEIGHT: 65 IN | BODY MASS INDEX: 26.99 KG/M2

## 2024-11-12 DIAGNOSIS — S52.123A DISPLACED FRACTURE OF HEAD OF UNSPECIFIED RADIUS, INITIAL ENCOUNTER FOR CLOSED FRACTURE: ICD-10-CM

## 2024-11-12 PROCEDURE — 73080 X-RAY EXAM OF ELBOW: CPT | Mod: RT

## 2024-11-12 PROCEDURE — 99213 OFFICE O/P EST LOW 20 MIN: CPT

## 2024-11-19 ENCOUNTER — TRANSCRIPTION ENCOUNTER (OUTPATIENT)
Age: 84
End: 2024-11-19

## 2024-11-20 PROCEDURE — 76536 US EXAM OF HEAD AND NECK: CPT

## 2024-12-11 ENCOUNTER — APPOINTMENT (OUTPATIENT)
Dept: ENDOCRINOLOGY | Facility: CLINIC | Age: 84
End: 2024-12-11
Payer: MEDICARE

## 2024-12-11 VITALS
WEIGHT: 162 LBS | HEIGHT: 65 IN | DIASTOLIC BLOOD PRESSURE: 82 MMHG | BODY MASS INDEX: 26.99 KG/M2 | SYSTOLIC BLOOD PRESSURE: 124 MMHG | OXYGEN SATURATION: 96 % | HEART RATE: 75 BPM

## 2024-12-11 DIAGNOSIS — E04.2 NONTOXIC MULTINODULAR GOITER: ICD-10-CM

## 2024-12-11 DIAGNOSIS — M81.0 AGE-RELATED OSTEOPOROSIS W/OUT CURRENT PATHOLOGICAL FRACTURE: ICD-10-CM

## 2024-12-11 DIAGNOSIS — E78.00 PURE HYPERCHOLESTEROLEMIA, UNSPECIFIED: ICD-10-CM

## 2024-12-11 DIAGNOSIS — E11.9 TYPE 2 DIABETES MELLITUS W/OUT COMPLICATIONS: ICD-10-CM

## 2024-12-11 DIAGNOSIS — E03.9 HYPOTHYROIDISM, UNSPECIFIED: ICD-10-CM

## 2024-12-11 DIAGNOSIS — E04.1 NONTOXIC SINGLE THYROID NODULE: ICD-10-CM

## 2024-12-11 LAB — HBA1C MFR BLD HPLC: 6

## 2024-12-11 PROCEDURE — 99214 OFFICE O/P EST MOD 30 MIN: CPT

## 2024-12-11 PROCEDURE — 95251 CONT GLUC MNTR ANALYSIS I&R: CPT

## 2024-12-11 PROCEDURE — 83036 HEMOGLOBIN GLYCOSYLATED A1C: CPT | Mod: QW

## 2024-12-26 ENCOUNTER — NON-APPOINTMENT (OUTPATIENT)
Age: 84
End: 2024-12-26

## 2024-12-30 ENCOUNTER — TRANSCRIPTION ENCOUNTER (OUTPATIENT)
Age: 84
End: 2024-12-30

## 2025-01-06 ENCOUNTER — RX RENEWAL (OUTPATIENT)
Age: 85
End: 2025-01-06

## 2025-01-10 DIAGNOSIS — Z12.31 ENCOUNTER FOR SCREENING MAMMOGRAM FOR MALIGNANT NEOPLASM OF BREAST: ICD-10-CM

## 2025-01-13 ENCOUNTER — TRANSCRIPTION ENCOUNTER (OUTPATIENT)
Age: 85
End: 2025-01-13

## 2025-01-16 ENCOUNTER — APPOINTMENT (OUTPATIENT)
Dept: MAMMOGRAPHY | Facility: HOSPITAL | Age: 85
End: 2025-01-16
Payer: MEDICARE

## 2025-01-16 ENCOUNTER — OUTPATIENT (OUTPATIENT)
Dept: OUTPATIENT SERVICES | Facility: HOSPITAL | Age: 85
LOS: 1 days | End: 2025-01-16
Payer: MEDICARE

## 2025-01-16 ENCOUNTER — APPOINTMENT (OUTPATIENT)
Dept: OBGYN | Facility: CLINIC | Age: 85
End: 2025-01-16
Payer: MEDICARE

## 2025-01-16 ENCOUNTER — RESULT REVIEW (OUTPATIENT)
Age: 85
End: 2025-01-16

## 2025-01-16 VITALS — SYSTOLIC BLOOD PRESSURE: 127 MMHG | DIASTOLIC BLOOD PRESSURE: 73 MMHG

## 2025-01-16 DIAGNOSIS — N95.0 POSTMENOPAUSAL BLEEDING: ICD-10-CM

## 2025-01-16 DIAGNOSIS — Z96.652 PRESENCE OF LEFT ARTIFICIAL KNEE JOINT: Chronic | ICD-10-CM

## 2025-01-16 DIAGNOSIS — D21.9 BENIGN NEOPLASM OF CONNECTIVE AND OTHER SOFT TISSUE, UNSPECIFIED: ICD-10-CM

## 2025-01-16 DIAGNOSIS — B00.9 HERPESVIRAL INFECTION, UNSPECIFIED: ICD-10-CM

## 2025-01-16 DIAGNOSIS — Z12.31 ENCOUNTER FOR SCREENING MAMMOGRAM FOR MALIGNANT NEOPLASM OF BREAST: ICD-10-CM

## 2025-01-16 PROCEDURE — 77063 BREAST TOMOSYNTHESIS BI: CPT

## 2025-01-16 PROCEDURE — 58100 BIOPSY OF UTERUS LINING: CPT

## 2025-01-16 PROCEDURE — 76830 TRANSVAGINAL US NON-OB: CPT

## 2025-01-16 PROCEDURE — 77067 SCR MAMMO BI INCL CAD: CPT

## 2025-01-16 PROCEDURE — 77067 SCR MAMMO BI INCL CAD: CPT | Mod: 26

## 2025-01-16 PROCEDURE — 99214 OFFICE O/P EST MOD 30 MIN: CPT | Mod: 25

## 2025-01-16 PROCEDURE — 77063 BREAST TOMOSYNTHESIS BI: CPT | Mod: 26

## 2025-01-16 RX ORDER — VALACYCLOVIR 500 MG/1
500 TABLET, FILM COATED ORAL DAILY
Qty: 3 | Refills: 3 | Status: ACTIVE | COMMUNITY
Start: 2025-01-16 | End: 1900-01-01

## 2025-01-21 ENCOUNTER — TRANSCRIPTION ENCOUNTER (OUTPATIENT)
Age: 85
End: 2025-01-21

## 2025-01-21 LAB — CORE LAB BIOPSY: NORMAL

## 2025-01-22 ENCOUNTER — TRANSCRIPTION ENCOUNTER (OUTPATIENT)
Age: 85
End: 2025-01-22

## 2025-01-29 ENCOUNTER — TRANSCRIPTION ENCOUNTER (OUTPATIENT)
Age: 85
End: 2025-01-29

## 2025-01-29 DIAGNOSIS — H81.10 BENIGN PAROXYSMAL VERTIGO, UNSPECIFIED EAR: ICD-10-CM

## 2025-02-28 ENCOUNTER — RX RENEWAL (OUTPATIENT)
Age: 85
End: 2025-02-28

## 2025-03-27 ENCOUNTER — APPOINTMENT (OUTPATIENT)
Dept: OBGYN | Facility: CLINIC | Age: 85
End: 2025-03-27
Payer: MEDICARE

## 2025-03-27 ENCOUNTER — NON-APPOINTMENT (OUTPATIENT)
Age: 85
End: 2025-03-27

## 2025-03-27 VITALS
HEIGHT: 65 IN | BODY MASS INDEX: 27.66 KG/M2 | DIASTOLIC BLOOD PRESSURE: 75 MMHG | SYSTOLIC BLOOD PRESSURE: 133 MMHG | WEIGHT: 166 LBS

## 2025-03-27 DIAGNOSIS — Z13.31 ENCOUNTER FOR SCREENING FOR DEPRESSION: ICD-10-CM

## 2025-03-27 DIAGNOSIS — Z01.411 ENCOUNTER FOR GYNECOLOGICAL EXAMINATION (GENERAL) (ROUTINE) WITH ABNORMAL FINDINGS: ICD-10-CM

## 2025-03-27 DIAGNOSIS — B00.9 HERPESVIRAL INFECTION, UNSPECIFIED: ICD-10-CM

## 2025-03-27 DIAGNOSIS — M85.80 OTHER SPECIFIED DISORDERS OF BONE DENSITY AND STRUCTURE, UNSPECIFIED SITE: ICD-10-CM

## 2025-03-27 DIAGNOSIS — N39.3 STRESS INCONTINENCE (FEMALE) (MALE): ICD-10-CM

## 2025-03-27 DIAGNOSIS — D21.9 BENIGN NEOPLASM OF CONNECTIVE AND OTHER SOFT TISSUE, UNSPECIFIED: ICD-10-CM

## 2025-03-27 DIAGNOSIS — B37.2 CANDIDIASIS OF SKIN AND NAIL: ICD-10-CM

## 2025-03-27 DIAGNOSIS — N90.5 ATROPHY OF VULVA: ICD-10-CM

## 2025-03-27 PROCEDURE — 99214 OFFICE O/P EST MOD 30 MIN: CPT | Mod: 25

## 2025-03-27 PROCEDURE — G0444 DEPRESSION SCREEN ANNUAL: CPT | Mod: 59

## 2025-03-27 PROCEDURE — G0101: CPT

## 2025-03-27 PROCEDURE — G0328 FECAL BLOOD SCRN IMMUNOASSAY: CPT | Mod: QW

## 2025-03-27 PROCEDURE — 99459 PELVIC EXAMINATION: CPT

## 2025-03-27 RX ORDER — NYSTATIN 100000 [USP'U]/G
100000 POWDER TOPICAL
Qty: 1 | Refills: 2 | Status: ACTIVE | COMMUNITY
Start: 2025-03-27 | End: 1900-01-01

## 2025-04-09 ENCOUNTER — APPOINTMENT (OUTPATIENT)
Dept: ENDOCRINOLOGY | Facility: CLINIC | Age: 85
End: 2025-04-09

## 2025-04-09 VITALS
HEIGHT: 65 IN | SYSTOLIC BLOOD PRESSURE: 110 MMHG | OXYGEN SATURATION: 98 % | WEIGHT: 166 LBS | BODY MASS INDEX: 27.66 KG/M2 | HEART RATE: 84 BPM | DIASTOLIC BLOOD PRESSURE: 80 MMHG

## 2025-04-09 DIAGNOSIS — M81.0 AGE-RELATED OSTEOPOROSIS W/OUT CURRENT PATHOLOGICAL FRACTURE: ICD-10-CM

## 2025-04-09 DIAGNOSIS — E04.2 NONTOXIC MULTINODULAR GOITER: ICD-10-CM

## 2025-04-09 LAB — HBA1C MFR BLD HPLC: 6.2

## 2025-04-09 PROCEDURE — 99214 OFFICE O/P EST MOD 30 MIN: CPT

## 2025-04-09 PROCEDURE — 95251 CONT GLUC MNTR ANALYSIS I&R: CPT

## 2025-04-09 PROCEDURE — 83036 HEMOGLOBIN GLYCOSYLATED A1C: CPT | Mod: QW

## 2025-04-16 ENCOUNTER — APPOINTMENT (OUTPATIENT)
Dept: INTERNAL MEDICINE | Facility: CLINIC | Age: 85
End: 2025-04-16
Payer: MEDICARE

## 2025-04-16 VITALS
OXYGEN SATURATION: 96 % | SYSTOLIC BLOOD PRESSURE: 138 MMHG | TEMPERATURE: 97.1 F | WEIGHT: 169 LBS | BODY MASS INDEX: 28.16 KG/M2 | HEART RATE: 72 BPM | RESPIRATION RATE: 16 BRPM | DIASTOLIC BLOOD PRESSURE: 72 MMHG | HEIGHT: 65 IN

## 2025-04-16 DIAGNOSIS — Z86.39 PERSONAL HISTORY OF OTHER ENDOCRINE, NUTRITIONAL AND METABOLIC DISEASE: ICD-10-CM

## 2025-04-16 DIAGNOSIS — K21.9 GASTRO-ESOPHAGEAL REFLUX DISEASE W/OUT ESOPHAGITIS: ICD-10-CM

## 2025-04-16 DIAGNOSIS — E78.00 PURE HYPERCHOLESTEROLEMIA, UNSPECIFIED: ICD-10-CM

## 2025-04-16 DIAGNOSIS — K59.00 CONSTIPATION, UNSPECIFIED: ICD-10-CM

## 2025-04-16 DIAGNOSIS — Z00.00 ENCOUNTER FOR GENERAL ADULT MEDICAL EXAMINATION W/OUT ABNORMAL FINDINGS: ICD-10-CM

## 2025-04-16 DIAGNOSIS — F32.A ANXIETY DISORDER, UNSPECIFIED: ICD-10-CM

## 2025-04-16 DIAGNOSIS — Z87.09 PERSONAL HISTORY OF OTHER DISEASES OF THE RESPIRATORY SYSTEM: ICD-10-CM

## 2025-04-16 DIAGNOSIS — F41.9 ANXIETY DISORDER, UNSPECIFIED: ICD-10-CM

## 2025-04-16 DIAGNOSIS — B37.2 CANDIDIASIS OF SKIN AND NAIL: ICD-10-CM

## 2025-04-16 DIAGNOSIS — E03.9 HYPOTHYROIDISM, UNSPECIFIED: ICD-10-CM

## 2025-04-16 DIAGNOSIS — N39.41 URGE INCONTINENCE: ICD-10-CM

## 2025-04-16 DIAGNOSIS — Z12.11 ENCOUNTER FOR SCREENING FOR MALIGNANT NEOPLASM OF COLON: ICD-10-CM

## 2025-04-16 DIAGNOSIS — Z12.39 ENCOUNTER FOR OTHER SCREENING FOR MALIGNANT NEOPLASM OF BREAST: ICD-10-CM

## 2025-04-16 DIAGNOSIS — E11.9 TYPE 2 DIABETES MELLITUS W/OUT COMPLICATIONS: ICD-10-CM

## 2025-04-16 DIAGNOSIS — Z87.42 PERSONAL HISTORY OF OTHER DISEASES OF THE FEMALE GENITAL TRACT: ICD-10-CM

## 2025-04-16 DIAGNOSIS — R05.8 OTHER SPECIFIED COUGH: ICD-10-CM

## 2025-04-16 DIAGNOSIS — Z23 ENCOUNTER FOR IMMUNIZATION: ICD-10-CM

## 2025-04-16 DIAGNOSIS — E07.9 DISORDER OF THYROID, UNSPECIFIED: ICD-10-CM

## 2025-04-16 DIAGNOSIS — Z87.898 PERSONAL HISTORY OF OTHER SPECIFIED CONDITIONS: ICD-10-CM

## 2025-04-16 DIAGNOSIS — Z86.59 PERSONAL HISTORY OF OTHER MENTAL AND BEHAVIORAL DISORDERS: ICD-10-CM

## 2025-04-16 PROCEDURE — 99214 OFFICE O/P EST MOD 30 MIN: CPT

## 2025-04-18 ENCOUNTER — TRANSCRIPTION ENCOUNTER (OUTPATIENT)
Age: 85
End: 2025-04-18

## 2025-05-12 ENCOUNTER — RX RENEWAL (OUTPATIENT)
Age: 85
End: 2025-05-12

## 2025-05-14 ENCOUNTER — APPOINTMENT (OUTPATIENT)
Dept: GASTROENTEROLOGY | Facility: CLINIC | Age: 85
End: 2025-05-14
Payer: MEDICARE

## 2025-05-14 VITALS
HEIGHT: 65 IN | HEART RATE: 72 BPM | WEIGHT: 170 LBS | DIASTOLIC BLOOD PRESSURE: 84 MMHG | TEMPERATURE: 98.5 F | SYSTOLIC BLOOD PRESSURE: 130 MMHG | RESPIRATION RATE: 16 BRPM | OXYGEN SATURATION: 96 % | BODY MASS INDEX: 28.32 KG/M2

## 2025-05-14 PROCEDURE — 99204 OFFICE O/P NEW MOD 45 MIN: CPT

## 2025-05-14 RX ORDER — SODIUM SULFATE, POTASSIUM SULFATE AND MAGNESIUM SULFATE 1.6; 3.13; 17.5 G/177ML; G/177ML; G/177ML
17.5-3.13-1.6 SOLUTION ORAL
Qty: 1 | Refills: 0 | Status: ACTIVE | COMMUNITY
Start: 2025-05-14 | End: 1900-01-01

## 2025-05-14 RX ORDER — SODIUM SULFATE, MAGNESIUM SULFATE, AND POTASSIUM CHLORIDE 17.75; 2.7; 2.25 G/1; G/1; G/1
1479-225-188 TABLET ORAL
Qty: 24 | Refills: 0 | Status: ACTIVE | COMMUNITY
Start: 2025-05-14 | End: 1900-01-01

## 2025-05-16 ENCOUNTER — TRANSCRIPTION ENCOUNTER (OUTPATIENT)
Age: 85
End: 2025-05-16

## 2025-05-20 ENCOUNTER — LABORATORY RESULT (OUTPATIENT)
Age: 85
End: 2025-05-20

## 2025-05-20 ENCOUNTER — APPOINTMENT (OUTPATIENT)
Dept: INTERNAL MEDICINE | Facility: CLINIC | Age: 85
End: 2025-05-20
Payer: MEDICARE

## 2025-05-20 VITALS
DIASTOLIC BLOOD PRESSURE: 60 MMHG | WEIGHT: 169 LBS | TEMPERATURE: 97.2 F | BODY MASS INDEX: 28.16 KG/M2 | HEART RATE: 79 BPM | HEIGHT: 65 IN | SYSTOLIC BLOOD PRESSURE: 124 MMHG | OXYGEN SATURATION: 96 % | RESPIRATION RATE: 14 BRPM

## 2025-05-20 DIAGNOSIS — M25.552 PAIN IN LEFT HIP: ICD-10-CM

## 2025-05-20 LAB
ANION GAP SERPL CALC-SCNC: 14 MMOL/L
BUN SERPL-MCNC: 21 MG/DL
CALCIUM SERPL-MCNC: 9.5 MG/DL
CHLORIDE SERPL-SCNC: 104 MMOL/L
CO2 SERPL-SCNC: 22 MMOL/L
CREAT SERPL-MCNC: 0.99 MG/DL
CRP SERPL-MCNC: 7 MG/L
EGFRCR SERPLBLD CKD-EPI 2021: 56 ML/MIN/1.73M2
GLUCOSE SERPL-MCNC: 118 MG/DL
POTASSIUM SERPL-SCNC: 5 MMOL/L
SODIUM SERPL-SCNC: 141 MMOL/L

## 2025-05-20 PROCEDURE — 99213 OFFICE O/P EST LOW 20 MIN: CPT

## 2025-05-21 ENCOUNTER — OUTPATIENT (OUTPATIENT)
Dept: OUTPATIENT SERVICES | Facility: HOSPITAL | Age: 85
LOS: 1 days | End: 2025-05-21
Payer: MEDICARE

## 2025-05-21 ENCOUNTER — APPOINTMENT (OUTPATIENT)
Dept: RADIOLOGY | Facility: HOSPITAL | Age: 85
End: 2025-05-21
Payer: MEDICARE

## 2025-05-21 DIAGNOSIS — M25.552 PAIN IN LEFT HIP: ICD-10-CM

## 2025-05-21 DIAGNOSIS — Z96.652 PRESENCE OF LEFT ARTIFICIAL KNEE JOINT: Chronic | ICD-10-CM

## 2025-05-21 PROCEDURE — 73502 X-RAY EXAM HIP UNI 2-3 VIEWS: CPT

## 2025-05-21 PROCEDURE — 73502 X-RAY EXAM HIP UNI 2-3 VIEWS: CPT | Mod: 26,LT

## 2025-05-21 PROCEDURE — 72100 X-RAY EXAM L-S SPINE 2/3 VWS: CPT | Mod: 26

## 2025-05-21 PROCEDURE — 72100 X-RAY EXAM L-S SPINE 2/3 VWS: CPT

## 2025-05-28 DIAGNOSIS — R10.32 LEFT LOWER QUADRANT PAIN: ICD-10-CM

## 2025-05-28 DIAGNOSIS — D72.829 ELEVATED WHITE BLOOD CELL COUNT, UNSPECIFIED: ICD-10-CM

## 2025-05-28 DIAGNOSIS — R19.7 DIARRHEA, UNSPECIFIED: ICD-10-CM

## 2025-05-28 LAB
APPEARANCE: CLEAR
BASOPHILS # BLD AUTO: 0.06 K/UL
BASOPHILS NFR BLD AUTO: 0.5 %
BILIRUBIN URINE: NEGATIVE
BLOOD URINE: NEGATIVE
COLOR: YELLOW
EOSINOPHIL # BLD AUTO: 0.15 K/UL
EOSINOPHIL NFR BLD AUTO: 1.2 %
ERYTHROCYTE [SEDIMENTATION RATE] IN BLOOD BY WESTERGREN METHOD: 2 MM/HR
GLUCOSE QUALITATIVE U: NEGATIVE MG/DL
HCT VFR BLD CALC: 43.7 %
HGB BLD-MCNC: 13.5 G/DL
IMM GRANULOCYTES NFR BLD AUTO: 0.5 %
KETONES URINE: NEGATIVE MG/DL
LACTATE BLDA-MCNC: 1.3 MMOL/L
LEUKOCYTE ESTERASE URINE: ABNORMAL
LYMPHOCYTES # BLD AUTO: 1.67 K/UL
LYMPHOCYTES NFR BLD AUTO: 13.1 %
MAN DIFF?: NORMAL
MCHC RBC-ENTMCNC: 30.7 PG
MCHC RBC-ENTMCNC: 30.9 G/DL
MCV RBC AUTO: 99.3 FL
MONOCYTES # BLD AUTO: 0.8 K/UL
MONOCYTES NFR BLD AUTO: 6.3 %
NEUTROPHILS # BLD AUTO: 10.05 K/UL
NEUTROPHILS NFR BLD AUTO: 78.4 %
NITRITE URINE: NEGATIVE
PH URINE: 5.5
PLATELET # BLD AUTO: 380 K/UL
PROTEIN URINE: NEGATIVE MG/DL
RBC # BLD: 4.4 M/UL
RBC # FLD: 15.2 %
SPECIFIC GRAVITY URINE: 1.02
UROBILINOGEN URINE: 0.2 MG/DL
WBC # FLD AUTO: 12.79 K/UL

## 2025-05-29 ENCOUNTER — TRANSCRIPTION ENCOUNTER (OUTPATIENT)
Age: 85
End: 2025-05-29

## 2025-06-02 DIAGNOSIS — A09 INFECTIOUS GASTROENTERITIS AND COLITIS, UNSPECIFIED: ICD-10-CM

## 2025-06-02 LAB
ENTEROPATHOGENIC E. COLI (EPEC): DETECTED
GI PCR PANEL: DETECTED

## 2025-06-02 RX ORDER — AZITHROMYCIN 250 MG/1
250 TABLET, FILM COATED ORAL
Qty: 1 | Refills: 0 | Status: ACTIVE | COMMUNITY
Start: 2025-06-02 | End: 1900-01-01

## 2025-06-03 ENCOUNTER — NON-APPOINTMENT (OUTPATIENT)
Age: 85
End: 2025-06-03

## 2025-06-03 ENCOUNTER — TRANSCRIPTION ENCOUNTER (OUTPATIENT)
Age: 85
End: 2025-06-03

## 2025-06-03 LAB — PANCREATIC ELASTASE, FECAL: 186 CD:794062645

## 2025-06-04 ENCOUNTER — TRANSCRIPTION ENCOUNTER (OUTPATIENT)
Age: 85
End: 2025-06-04

## 2025-06-04 LAB
C DIFF TOXIN B QL PCR REFLEX: NORMAL
GDH ANTIGEN: NOT DETECTED
TOXIN A AND B: NOT DETECTED

## 2025-06-11 ENCOUNTER — NON-APPOINTMENT (OUTPATIENT)
Age: 85
End: 2025-06-11

## 2025-06-16 ENCOUNTER — TRANSCRIPTION ENCOUNTER (OUTPATIENT)
Age: 85
End: 2025-06-16

## 2025-06-16 PROBLEM — M76.892 TENDINITIS OF LEFT HIP FLEXOR: Status: ACTIVE | Noted: 2025-06-16

## 2025-06-19 ENCOUNTER — TRANSCRIPTION ENCOUNTER (OUTPATIENT)
Age: 85
End: 2025-06-19

## 2025-07-31 DIAGNOSIS — H90.3 SENSORINEURAL HEARING LOSS, BILATERAL: ICD-10-CM

## 2025-08-04 ENCOUNTER — NON-APPOINTMENT (OUTPATIENT)
Age: 85
End: 2025-08-04

## 2025-08-08 ENCOUNTER — RX RENEWAL (OUTPATIENT)
Age: 85
End: 2025-08-08

## 2025-08-14 ENCOUNTER — OUTPATIENT (OUTPATIENT)
Dept: OUTPATIENT SERVICES | Facility: HOSPITAL | Age: 85
LOS: 1 days | End: 2025-08-14
Payer: MEDICARE

## 2025-08-14 ENCOUNTER — APPOINTMENT (OUTPATIENT)
Dept: GASTROENTEROLOGY | Facility: HOSPITAL | Age: 85
End: 2025-08-14

## 2025-08-14 ENCOUNTER — RESULT REVIEW (OUTPATIENT)
Age: 85
End: 2025-08-14

## 2025-08-14 DIAGNOSIS — Z12.11 ENCOUNTER FOR SCREENING FOR MALIGNANT NEOPLASM OF COLON: ICD-10-CM

## 2025-08-14 DIAGNOSIS — Z96.652 PRESENCE OF LEFT ARTIFICIAL KNEE JOINT: Chronic | ICD-10-CM

## 2025-08-14 DIAGNOSIS — K22.70 BARRETT'S ESOPHAGUS WITHOUT DYSPLASIA: ICD-10-CM

## 2025-08-14 LAB — GLUCOSE BLDC GLUCOMTR-MCNC: 131 MG/DL — HIGH (ref 70–99)

## 2025-08-14 PROCEDURE — 88305 TISSUE EXAM BY PATHOLOGIST: CPT

## 2025-08-14 PROCEDURE — 45385 COLONOSCOPY W/LESION REMOVAL: CPT

## 2025-08-14 PROCEDURE — 43239 EGD BIOPSY SINGLE/MULTIPLE: CPT

## 2025-08-14 PROCEDURE — 45385 COLONOSCOPY W/LESION REMOVAL: CPT | Mod: PT

## 2025-08-14 PROCEDURE — 88312 SPECIAL STAINS GROUP 1: CPT

## 2025-08-14 PROCEDURE — 82962 GLUCOSE BLOOD TEST: CPT

## 2025-08-14 PROCEDURE — 88312 SPECIAL STAINS GROUP 1: CPT | Mod: 26

## 2025-08-14 PROCEDURE — 88305 TISSUE EXAM BY PATHOLOGIST: CPT | Mod: 26

## 2025-08-14 RX ADMIN — Medication 75 MILLILITER(S): at 09:08

## 2025-08-15 LAB — SURGICAL PATHOLOGY STUDY: SIGNIFICANT CHANGE UP

## 2025-08-27 ENCOUNTER — APPOINTMENT (OUTPATIENT)
Dept: INTERNAL MEDICINE | Facility: CLINIC | Age: 85
End: 2025-08-27
Payer: MEDICARE

## 2025-08-27 ENCOUNTER — APPOINTMENT (OUTPATIENT)
Dept: RADIOLOGY | Facility: HOSPITAL | Age: 85
End: 2025-08-27
Payer: MEDICARE

## 2025-08-27 ENCOUNTER — OUTPATIENT (OUTPATIENT)
Dept: OUTPATIENT SERVICES | Facility: HOSPITAL | Age: 85
LOS: 1 days | End: 2025-08-27
Payer: MEDICARE

## 2025-08-27 VITALS
SYSTOLIC BLOOD PRESSURE: 154 MMHG | RESPIRATION RATE: 14 BRPM | TEMPERATURE: 97.3 F | OXYGEN SATURATION: 98 % | HEART RATE: 77 BPM | BODY MASS INDEX: 28.17 KG/M2 | DIASTOLIC BLOOD PRESSURE: 80 MMHG | HEIGHT: 64.37 IN | WEIGHT: 165 LBS

## 2025-08-27 DIAGNOSIS — M54.9 DORSALGIA, UNSPECIFIED: ICD-10-CM

## 2025-08-27 PROCEDURE — 72070 X-RAY EXAM THORAC SPINE 2VWS: CPT

## 2025-08-27 PROCEDURE — 72070 X-RAY EXAM THORAC SPINE 2VWS: CPT | Mod: 26

## 2025-08-27 PROCEDURE — 99213 OFFICE O/P EST LOW 20 MIN: CPT

## 2025-08-28 ENCOUNTER — TRANSCRIPTION ENCOUNTER (OUTPATIENT)
Age: 85
End: 2025-08-28

## 2025-09-05 ENCOUNTER — TRANSCRIPTION ENCOUNTER (OUTPATIENT)
Age: 85
End: 2025-09-05

## 2025-09-05 DIAGNOSIS — M41.9 SCOLIOSIS, UNSPECIFIED: ICD-10-CM

## 2025-09-05 PROBLEM — M47.814 OSTEOARTHRITIS OF THORACIC SPINE, UNSPECIFIED SPINAL OSTEOARTHRITIS COMPLICATION STATUS: Status: ACTIVE | Noted: 2025-09-05

## 2025-09-08 ENCOUNTER — TRANSCRIPTION ENCOUNTER (OUTPATIENT)
Age: 85
End: 2025-09-08

## 2025-09-08 ENCOUNTER — NON-APPOINTMENT (OUTPATIENT)
Age: 85
End: 2025-09-08

## 2025-09-10 ENCOUNTER — APPOINTMENT (OUTPATIENT)
Dept: ORTHOPEDIC SURGERY | Facility: CLINIC | Age: 85
End: 2025-09-10
Payer: MEDICARE

## 2025-09-10 ENCOUNTER — APPOINTMENT (OUTPATIENT)
Dept: ENDOCRINOLOGY | Facility: CLINIC | Age: 85
End: 2025-09-10

## 2025-09-10 VITALS
HEIGHT: 64.37 IN | SYSTOLIC BLOOD PRESSURE: 110 MMHG | HEART RATE: 82 BPM | WEIGHT: 166 LBS | BODY MASS INDEX: 28.34 KG/M2 | OXYGEN SATURATION: 98 % | DIASTOLIC BLOOD PRESSURE: 62 MMHG

## 2025-09-10 DIAGNOSIS — M76.892 OTHER SPECIFIED ENTHESOPATHIES OF LEFT LOWER LIMB, EXCLUDING FOOT: ICD-10-CM

## 2025-09-10 DIAGNOSIS — M81.0 AGE-RELATED OSTEOPOROSIS W/OUT CURRENT PATHOLOGICAL FRACTURE: ICD-10-CM

## 2025-09-10 DIAGNOSIS — M47.816 SPONDYLOSIS W/OUT MYELOPATHY OR RADICULOPATHY, LUMBAR REGION: ICD-10-CM

## 2025-09-10 DIAGNOSIS — M16.0 BILATERAL PRIMARY OSTEOARTHRITIS OF HIP: ICD-10-CM

## 2025-09-10 DIAGNOSIS — E04.1 NONTOXIC SINGLE THYROID NODULE: ICD-10-CM

## 2025-09-10 DIAGNOSIS — M47.814 SPONDYLOSIS W/OUT MYELOPATHY OR RADICULOPATHY, THORACIC REGION: ICD-10-CM

## 2025-09-10 DIAGNOSIS — E04.2 NONTOXIC MULTINODULAR GOITER: ICD-10-CM

## 2025-09-10 LAB — HBA1C MFR BLD HPLC: 6.4

## 2025-09-10 PROCEDURE — 99214 OFFICE O/P EST MOD 30 MIN: CPT

## 2025-09-10 PROCEDURE — 95251 CONT GLUC MNTR ANALYSIS I&R: CPT

## 2025-09-10 PROCEDURE — 83036 HEMOGLOBIN GLYCOSYLATED A1C: CPT | Mod: QW

## 2025-09-10 RX ORDER — TIRZEPATIDE 2.5 MG/.5ML
2.5 INJECTION, SOLUTION SUBCUTANEOUS
Qty: 1 | Refills: 2 | Status: ACTIVE | COMMUNITY
Start: 2025-09-10 | End: 1900-01-01

## 2025-09-10 RX ORDER — MELOXICAM 15 MG/1
15 TABLET ORAL
Qty: 30 | Refills: 0 | Status: ACTIVE | COMMUNITY
Start: 2025-09-10 | End: 1900-01-01

## 2025-09-12 LAB
25(OH)D3 SERPL-MCNC: 33 NG/ML
ALBUMIN SERPL ELPH-MCNC: 4.6 G/DL
ALBUMIN, RANDOM URINE: 2.2 MG/DL
ALP BLD-CCNC: 100 U/L
ALT SERPL-CCNC: 22 U/L
ANION GAP SERPL CALC-SCNC: 15 MMOL/L
AST SERPL-CCNC: 21 U/L
BASOPHILS # BLD AUTO: 0.08 K/UL
BASOPHILS NFR BLD AUTO: 0.7 %
BILIRUB SERPL-MCNC: 0.2 MG/DL
BUN SERPL-MCNC: 21 MG/DL
CALCIUM SERPL-MCNC: 10.2 MG/DL
CHLORIDE SERPL-SCNC: 102 MMOL/L
CO2 SERPL-SCNC: 23 MMOL/L
CREAT SERPL-MCNC: 1 MG/DL
CREAT SPEC-SCNC: 148 MG/DL
EGFRCR SERPLBLD CKD-EPI 2021: 56 ML/MIN/1.73M2
EOSINOPHIL # BLD AUTO: 0.5 K/UL
EOSINOPHIL NFR BLD AUTO: 4.3 %
GLUCOSE SERPL-MCNC: 132 MG/DL
HCT VFR BLD CALC: 42.2 %
HGB BLD-MCNC: 13.6 G/DL
IMM GRANULOCYTES NFR BLD AUTO: 0.3 %
LYMPHOCYTES # BLD AUTO: 2.76 K/UL
LYMPHOCYTES NFR BLD AUTO: 23.5 %
MAN DIFF?: NORMAL
MCHC RBC-ENTMCNC: 31.7 PG
MCHC RBC-ENTMCNC: 32.2 G/DL
MCV RBC AUTO: 98.4 FL
MICROALBUMIN/CREAT 24H UR-RTO: 15 MG/G
MONOCYTES # BLD AUTO: 0.88 K/UL
MONOCYTES NFR BLD AUTO: 7.5 %
NEUTROPHILS # BLD AUTO: 7.48 K/UL
NEUTROPHILS NFR BLD AUTO: 63.7 %
PLATELET # BLD AUTO: 387 K/UL
POTASSIUM SERPL-SCNC: 5.5 MMOL/L
PROT SERPL-MCNC: 7 G/DL
RBC # BLD: 4.29 M/UL
RBC # FLD: 14.7 %
SODIUM SERPL-SCNC: 140 MMOL/L
T4 FREE SERPL-MCNC: 0.9 NG/DL
TSH SERPL-ACNC: 2.3 UIU/ML
VIT B12 SERPL-MCNC: 634 PG/ML
WBC # FLD AUTO: 11.74 K/UL

## 2025-09-17 ENCOUNTER — TRANSCRIPTION ENCOUNTER (OUTPATIENT)
Age: 85
End: 2025-09-17

## 2025-09-18 ENCOUNTER — TRANSCRIPTION ENCOUNTER (OUTPATIENT)
Age: 85
End: 2025-09-18

## 2025-09-18 DIAGNOSIS — D72.828 OTHER ELEVATED WHITE BLOOD CELL COUNT: ICD-10-CM

## 2025-09-18 DIAGNOSIS — E78.00 PURE HYPERCHOLESTEROLEMIA, UNSPECIFIED: ICD-10-CM

## 2025-09-18 DIAGNOSIS — E87.5 HYPERKALEMIA: ICD-10-CM

## 2025-09-18 DIAGNOSIS — E03.9 HYPOTHYROIDISM, UNSPECIFIED: ICD-10-CM

## 2025-09-18 DIAGNOSIS — K21.9 GASTRO-ESOPHAGEAL REFLUX DISEASE W/OUT ESOPHAGITIS: ICD-10-CM

## 2025-09-18 DIAGNOSIS — Z00.00 ENCOUNTER FOR GENERAL ADULT MEDICAL EXAMINATION W/OUT ABNORMAL FINDINGS: ICD-10-CM

## 2025-09-18 DIAGNOSIS — E11.9 TYPE 2 DIABETES MELLITUS W/OUT COMPLICATIONS: ICD-10-CM

## 2025-09-22 PROBLEM — M51.34 DEGENERATION OF THORACIC INTERVERTEBRAL DISC: Status: ACTIVE | Noted: 2025-09-22

## (undated) DEVICE — TUBING SUCTION NONCONDUCTIVE 6MM X 12FT

## (undated) DEVICE — VENODYNE/SCD SLEEVE CALF MEDIUM

## (undated) DEVICE — SOL IRR POUR H2O 1000ML

## (undated) DEVICE — DRSG TELFA 3 X 8

## (undated) DEVICE — SNARE POLYP SENS SM 13MM 240CM

## (undated) DEVICE — SNARE EXACTO COLD 9MMX230CM

## (undated) DEVICE — ENDOCUFF VISION SZ 2 LG GRN

## (undated) DEVICE — GLV 8 PROTEXIS (WHITE)

## (undated) DEVICE — DRSG CURITY GAUZE SPONGE 4 X 4" 12-PLY

## (undated) DEVICE — GLV 7.5 PROTEXIS (WHITE)

## (undated) DEVICE — TUBING CAP SET ERBEFLO CLEVERCAP HYBRID CO2 FOR OLYMPUS SCOPES AND UCR

## (undated) DEVICE — KNIFE MEDTRONIC ENT MICROFRANCE SATALOFF SHARP

## (undated) DEVICE — WARMING BLANKET UPPER ADULT

## (undated) DEVICE — NDL HYPO REGULAR BEVEL 25G X 1.5" (BLUE)

## (undated) DEVICE — LABELS BLANK W PEN

## (undated) DEVICE — FORCEP RADIAL JAW 4 W NDL 2.4MM 2.8MM 240CM ORANGE DISP

## (undated) DEVICE — DRAPE 3/4 SHEET 52X76"

## (undated) DEVICE — STAPLER SKIN VISI-STAT 35 WIDE

## (undated) DEVICE — KIT ENDO PROCEDURE CUST W/VLV

## (undated) DEVICE — TRAP SPECIMEN SPUTUM 40CC

## (undated) DEVICE — PLATE NESSY 170

## (undated) DEVICE — Device

## (undated) DEVICE — DRAPE TOWEL BLUE 17" X 24"

## (undated) DEVICE — SOL ANTI FOG

## (undated) DEVICE — LIJ-SATALOFF ANT COMMISSURE LARYNGOSCOPE: Type: DURABLE MEDICAL EQUIPMENT

## (undated) DEVICE — GOWN LG

## (undated) DEVICE — POLY TRAP ETRAP